# Patient Record
Sex: FEMALE | Race: WHITE | NOT HISPANIC OR LATINO | Employment: FULL TIME | URBAN - METROPOLITAN AREA
[De-identification: names, ages, dates, MRNs, and addresses within clinical notes are randomized per-mention and may not be internally consistent; named-entity substitution may affect disease eponyms.]

---

## 2017-08-29 ENCOUNTER — TELEPHONE (OUTPATIENT)
Dept: NEUROLOGY | Facility: CLINIC | Age: 57
End: 2017-08-29

## 2017-08-29 NOTE — TELEPHONE ENCOUNTER
----- Message from Carmelita Gilliam sent at 8/29/2017  4:15 PM CDT -----  Contact: Patient [phone]  Patient is calling to let Sue know that the appt change is fine.

## 2017-08-31 ENCOUNTER — OFFICE VISIT (OUTPATIENT)
Dept: NEUROLOGY | Facility: CLINIC | Age: 57
End: 2017-08-31
Payer: COMMERCIAL

## 2017-08-31 VITALS
WEIGHT: 196.63 LBS | HEIGHT: 64 IN | DIASTOLIC BLOOD PRESSURE: 89 MMHG | SYSTOLIC BLOOD PRESSURE: 159 MMHG | HEART RATE: 73 BPM | BODY MASS INDEX: 33.57 KG/M2

## 2017-08-31 DIAGNOSIS — G25.2 ORTHOSTATIC TREMOR: Primary | ICD-10-CM

## 2017-08-31 PROCEDURE — 99999 PR PBB SHADOW E&M-EST. PATIENT-LVL III: CPT | Mod: PBBFAC,,, | Performed by: PHYSICIAN ASSISTANT

## 2017-08-31 PROCEDURE — 99204 OFFICE O/P NEW MOD 45 MIN: CPT | Mod: S$GLB,,, | Performed by: PHYSICIAN ASSISTANT

## 2017-08-31 PROCEDURE — 3008F BODY MASS INDEX DOCD: CPT | Mod: S$GLB,,, | Performed by: PHYSICIAN ASSISTANT

## 2017-08-31 RX ORDER — CHLORHEXIDINE GLUCONATE ORAL RINSE 1.2 MG/ML
SOLUTION DENTAL
Refills: 0 | COMMUNITY
Start: 2017-07-20

## 2017-08-31 RX ORDER — LITHIUM CARBONATE 300 MG/1
300 CAPSULE ORAL
Refills: 5 | COMMUNITY
Start: 2017-07-31

## 2017-08-31 RX ORDER — METOPROLOL SUCCINATE 25 MG/1
25 TABLET, EXTENDED RELEASE ORAL
Refills: 3 | COMMUNITY
Start: 2017-07-28

## 2017-08-31 RX ORDER — HYDROCODONE BITARTRATE AND ACETAMINOPHEN 7.5; 325 MG/1; MG/1
TABLET ORAL
Refills: 0 | COMMUNITY
Start: 2017-07-20

## 2017-08-31 RX ORDER — HYDROCODONE BITARTRATE AND ACETAMINOPHEN 5; 325 MG/1; MG/1
325 TABLET ORAL
Refills: 0 | COMMUNITY
Start: 2017-08-17

## 2017-08-31 RX ORDER — GABAPENTIN 100 MG/1
100 CAPSULE ORAL 3 TIMES DAILY
Qty: 90 CAPSULE | Refills: 2 | Status: SHIPPED | OUTPATIENT
Start: 2017-08-31 | End: 2018-09-04

## 2017-08-31 NOTE — PROGRESS NOTES
[facility], Department of Neurology   [address] [place] [address]  Phone:[phone]  Fax: [phone]    Patient Name: Carrol Molina  : 1960  MRN:  [id number]    2017     chief complaint: balance difficulty, tremors     PCP: Dillan Resendez MD.    HPI 17:  Carrol Molina is a 56 y.o. female with HTN, bipolar disorder, and hx bl TKA presents for balance difficulty when standing. Notes onset of symptoms >6 yrs with progressive worsening. Previously had intermittent balance issues with standing now feels like it is more constant. She notes balance difficulty and feeling like she will fall when standing still. has associated tremors of the hands and legs which have gradually become more pronounced. Typically needs to hold on to something while standing. Can stand about 15 seconds before feeling urge to sit. Has to bring a chair with her to garden. Doesn't use assistive device. Denies falls. Symptoms improve with walking, denies balance issues when ambulating.     Denies weakness of LE. Able to go up stairs, rides recumbant bike every morning. Denies difficulty with chewing/swallowing/speech. Denies neck/back pain or hx of previous spine surgery.     Has had MRI brain, cervical spine, and lumbar spine which have been unremarkable for etiology. Had EMG which was normal. (will have these records scanned into EPIC).     Medications:   Current Outpatient Prescriptions   Medication Sig Dispense Refill    chlorhexidine (PERIDEX) 0.12 % solution RINSE AND SPIT WITH 15ML FOR 30 SECONDS BID  0    gabapentin (NEURONTIN) 100 MG capsule Take 1 capsule (100 mg total) by mouth 3 (three) times daily. 90 capsule 2    hydrocodone-acetaminophen 5-325mg (NORCO) 5-325 mg per tablet 325 tablets.  0    hydrocodone-acetaminophen 7.5-325mg (NORCO) 7.5-325 mg per tablet TK 1 T PO Q 4 HOURS PRF PAIN  0    lithium (ESKALITH) 300 MG capsule 300 mg.  5    metoprolol succinate  (TOPROL-XL) 25 MG 24 hr tablet 25 mg.  3     No current facility-administered medications for this visit.        Allergies:  Review of patient's allergies indicates:   Allergen Reactions    Diphenhydramine Other (See Comments)     chest pains    Tolmetin Other (See Comments)     stomach pains.    Codeine     Latex Itching and Swelling     Can the patient chew gum without allergic reation? yes  Can the patient blow up a balloon without reaction? No  Is the patient allergic to kiwi, bananas, avocado, or chestnuts? No  Immediate itch or reaction to latex gloves yes  Known allergy to latex (i.e. blood test)? Yes  Does the patient require special undergarments, such as panties/briefs with special waist bandno    Metronidazole Other (See Comments)     Sores on inside of tongue       PMHx:  Past Medical History:   Diagnosis Date    Bipolar disorder     Hypertension      Past Surgical History:   Procedure Laterality Date    CHOLECYSTECTOMY  2008    HYSTERECTOMY  1984    MOUTH SURGERY  07/2017    oopherectomy  2005    TONSILLECTOMY  1984    TOTAL KNEE ARTHROPLASTY       bilateral       Fhx:  Family History   Problem Relation Age of Onset    Hypertension Mother     Heart disease Father     Heart disease Brother        Shx:   Social History     Social History    Marital status:      Spouse name: N/A    Number of children: N/A    Years of education: N/A     Occupational History    Not on file.     Social History Main Topics    Smoking status: Never Smoker    Smokeless tobacco: Never Used    Alcohol use No    Drug use: Unknown    Sexual activity: Not on file     Other Topics Concern    Not on file     Social History Narrative    No narrative on file       ROS:  Review of Systems (Questions asked; positives or additions noted in BOLD)  Gen Malaise/fatigue, weight change   HEENT Hearing loss, blurred vision, diplopia   Card CP, palpitations   Pulm SOB, cough    Vasc Easy bruising, easy bleeding   "  GI Nausea, vomiting, constipation    Frequency, urgency   M/S Joint pain, myalgias, falls    Endocrine Temperature intolerance, polyuria, polydipsia   Neuro Dizziness, tremors, seizures, focal weakness, Others- as noted in HPI   Psych Memory loss, depression, anxiety, hallucinations     PHYSICAL EXAM:   BP (!) 159/89   Pulse 73   Ht 5' 4" (1.626 m)   Wt 89.2 kg (196 lb 10.4 oz)   BMI 33.76 kg/m²    GEN:  NAD, well-developed, well-groomed.  HEENT: No cervical lymphadenopathy noted.  CARDIOVASCULAR: Radial pulses 2+ bilaterally. No LE edema noted.  NEURO:  Mental Status: Awake, alert, and oriented. Normal attention and concentration.  Speech is fluent and appropriate language function.    Cranial Nerves:  II Pupils are round and reactive to direct and consensual light and accommodation. Visual fields full to confrontation.   III, IV, VI Extraocular eye movements full bilaterally. No ptosis noted.   V Normal facial sensation to pinprick and light touch.   VII Symmetric facial expressions.   VIII Hearing intact to finger rub bilaterally.   IX, X Palate elevates midline and symmetrically.   XI Shoulder elevation is symmetric and full strength bilaterally. Neck rotation strength is normal bilaterally.   XII Tongue is midline.     Sensory: Sensation is normal to pinprick n in the upper and lower extremities bilaterally. Romberg is unsteady.     Motor: Extremities demonstrate normal bulk and tone in all four limbs. No atrophy or fasciculations noted. No pronator drift.   Strength-       RUE: 5/5                LUE: 5/5                RLE: 5/5                LLE: 5/5     Deep Tendon Relfexes: 2+ and symmetric in the upper and lower extremities bilaterally. Babinski downgoing bilaterally.    Coordination: Finger to nose WNL.     Gait: Normal casual gait, some difficulty with tandem.  Low volume tremor of hand when walking.  Tremor of bl upper and lower extremities noted when pt stands still, onset after few seconds "     ASSESSMENT:     ICD-10-CM ICD-9-CM   1. Orthostatic tremor G25.2 333.1       PLAN:  Orders Placed This Encounter    CTA Head and Neck (xpd)    Ferritin    LITHIUM LEVEL    Ambulatory Referral to Neurology    gabapentin (NEURONTIN) 100 MG capsule     Orders Placed This Encounter   Procedures    CTA Head and Neck (xpd)    Ferritin    LITHIUM LEVEL    Ambulatory Referral to Neurology     Likely dx orthostatic tremor. Pt seen today by Dr. Wilson. Will have her f/u with her in clinic.     -labs today   -CTA head and neck   -f/u with Dr. Wilson in movement disorders         The patient indicates understanding of these issues and agrees to the plan.      Attending, Dr. Parker, was available during today's encounter. Any change to plan along with cosign to appear in the EMR.       This document has been electronically signed by Mirtha Schneider PA-C on 9/1/2017, 11:18 AM. I have personally typed this message using the EMR.

## 2017-08-31 NOTE — LETTER
September 1, 2017      Yo Yang MD  [address] [facility] [place] [address]           [facility]  [address]  [facility] [address]  Phone: [phone]  Fax: [phone]          Patient: Carrol Molina   MR Number: [id number]   YOB: 1960   Date of Visit: 8/31/2017       Dear Dr. Yo Yang:    Thank you for referring Carrol Molina to me for evaluation. Attached you will find relevant portions of my assessment and plan of care.    If you have questions, please do not hesitate to call me. I look forward to following Carrol Molina along with you.    Sincerely,    Mirtha Schneider PA-C    Enclosure  CC:  No Recipients    If you would like to receive this communication electronically, please contact [email or web address] or [phone] to request more information on [employer] Link access.    For providers and/or their staff who would like to refer a patient to [facility], please contact us through our one-stop-shop provider referral line, [facility], at [phone].    If you feel you have received this communication in error or would no longer like to receive these types of communications, please e-mail [email or web address]

## 2017-09-01 PROBLEM — G25.2 ORTHOSTATIC TREMOR: Status: ACTIVE | Noted: 2017-09-01

## 2017-09-01 PROBLEM — I10 ESSENTIAL HYPERTENSION: Status: ACTIVE | Noted: 2017-09-01

## 2017-09-01 PROBLEM — F31.9 BIPOLAR DISORDER: Status: ACTIVE | Noted: 2017-09-01

## 2017-09-26 ENCOUNTER — TELEPHONE (OUTPATIENT)
Dept: NEUROLOGY | Facility: CLINIC | Age: 57
End: 2017-09-26

## 2017-09-26 NOTE — TELEPHONE ENCOUNTER
RN attempted to contact [facility] office to discuss in more detail. Message sent to Casi LYMAN, Pre-,mar for clarification.

## 2017-09-26 NOTE — TELEPHONE ENCOUNTER
----- Message from Carmelita Gilliam sent at 9/26/2017  2:46 PM CDT -----  Contact: [facility] [phone]  [facility] is calling to left office know that patient appt that was with them on 9/27/17 has been canceled because a PA was not received from our office.

## 2017-09-27 ENCOUNTER — TELEPHONE (OUTPATIENT)
Dept: NEUROLOGY | Facility: CLINIC | Age: 57
End: 2017-09-27

## 2017-09-27 NOTE — TELEPHONE ENCOUNTER
Received call back from Abel. She states she received msg stating imaging does not require prior auth. LVM informing patient.

## 2017-09-27 NOTE — TELEPHONE ENCOUNTER
----- Message from Jevon Fernandez sent at 9/27/2017  8:50 AM CDT -----  Contact: Pt. [phone]  The patient would like to speak to someone regarding her CTA scheduled for today. They need approval from Mirtha.Please contact Abel at [phone].

## 2017-09-29 ENCOUNTER — TELEPHONE (OUTPATIENT)
Dept: NEUROLOGY | Facility: CLINIC | Age: 57
End: 2017-09-29

## 2017-09-29 DIAGNOSIS — I72.9 ANEURYSM: Primary | ICD-10-CM

## 2017-09-29 NOTE — TELEPHONE ENCOUNTER
----- Message from Faith Caal sent at 9/29/2017  9:10 AM CDT -----  Contact: self @ [phone] or [phone]  Pt is call for her CTA of head and neck results.  Pt says the results were suppose to be forwarded this past Wednesday.  pls call.

## 2017-09-29 NOTE — TELEPHONE ENCOUNTER
Received outside imaging: CTA head and neck. CTA neck unremarkable. 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 Pt currently on Gabapentin 100 bid for orthostatic tremor. She notes no improvement and SE of drowsiness. Discussed with Dr. Wilson. Recommend NSGY referral for evaluation of aneurysm, pt informed to bring imaging on a disk with her to appt. Also recommend changing Gabapentin to 200 mg qhs.

## 2017-10-02 ENCOUNTER — TELEPHONE (OUTPATIENT)
Dept: NEUROSURGERY | Facility: CLINIC | Age: 57
End: 2017-10-02

## 2017-10-02 NOTE — TELEPHONE ENCOUNTER
Advised patient that appointment was scheduled in error. Patient advised that Dr. Candelaria does not treat aneurysm. Advised keeley Dr. Ogden's assistant will call with appt. Patient verbalized understanding.

## 2017-10-03 ENCOUNTER — TELEPHONE (OUTPATIENT)
Dept: NEUROSURGERY | Facility: CLINIC | Age: 57
End: 2017-10-03

## 2017-10-03 NOTE — TELEPHONE ENCOUNTER
LVM WITH PT ON [facility]. DR ROGERS IS OUT OF THE OFFICE UNTIL 10-16, THIS IS THE FIRST AVAIL CLINIC DAY.

## 2017-10-03 NOTE — TELEPHONE ENCOUNTER
----- Message from Frieda Fernandez sent at 10/2/2017 10:08 AM CDT -----  Contact: Pt  Pt is scheduled for 10/16 for a brain aneurysm.  Pt would like to be seen sooner if possible.    Pt can be reached at [phone](work) or [phone](cell).    Thank you

## 2017-10-16 ENCOUNTER — OFFICE VISIT (OUTPATIENT)
Dept: NEUROSURGERY | Facility: CLINIC | Age: 57
End: 2017-10-16
Payer: COMMERCIAL

## 2017-10-16 VITALS
TEMPERATURE: 98 F | HEART RATE: 55 BPM | SYSTOLIC BLOOD PRESSURE: 148 MMHG | BODY MASS INDEX: 33.47 KG/M2 | DIASTOLIC BLOOD PRESSURE: 70 MMHG | WEIGHT: 195 LBS

## 2017-10-16 DIAGNOSIS — I67.1 CEREBRAL ANEURYSM: Primary | ICD-10-CM

## 2017-10-16 PROCEDURE — 99999 PR PBB SHADOW E&M-EST. PATIENT-LVL III: CPT | Mod: PBBFAC,,, | Performed by: NEUROLOGICAL SURGERY

## 2017-10-16 PROCEDURE — 99204 OFFICE O/P NEW MOD 45 MIN: CPT | Mod: S$GLB,,, | Performed by: NEUROLOGICAL SURGERY

## 2017-10-16 NOTE — LETTER
October 16, 2017      Mirtha Schneider PA-C  [address]  [facility] [address]           [facility] 7th Fl  [address]  [facility] [address]  Phone: [phone]          Patient: Carrol Molina   MR Number: [id number]   YOB: 1960   Date of Visit: 10/16/2017       Dear Mirtha Schneider:    Thank you for referring Carrol Molina to me for evaluation. Attached you will find relevant portions of my assessment and plan of care.    If you have questions, please do not hesitate to call me. I look forward to following Carrol Molina along with you.    Sincerely,    Tj Altamirano MD    Enclosure  CC:  No Recipients    If you would like to receive this communication electronically, please contact [email or web address] or [phone] to request more information on [employer] Link access.    For providers and/or their staff who would like to refer a patient to [facility], please contact us through our one-stop-shop provider referral line, [facility], at [phone].    If you feel you have received this communication in error or would no longer like to receive these types of communications, please e-mail [email or web address]

## 2018-07-25 ENCOUNTER — TELEPHONE (OUTPATIENT)
Dept: NEUROLOGY | Facility: CLINIC | Age: 58
End: 2018-07-25

## 2018-07-25 NOTE — TELEPHONE ENCOUNTER
----- Message from Ry Bennett sent at 7/24/2018  2:35 PM CDT -----  Needs Advice    Reason for call:  Pt is calling to schedule an appt w/ the doctor to f/u for orthostatic tremor. Pt said she saw the doctor when she came in for appt w/ Mirtha on 08/31/1  Communication Preference: [phone]  Additional Information:

## 2018-07-25 NOTE — TELEPHONE ENCOUNTER
----- Message from Francisco Ortiz sent at 7/25/2018  3:09 PM CDT -----  Contact: Patient @ work [phone] between 8am-5pm  Patient is calling to get an update on the f/u appt, pls call tomorrow

## 2018-07-25 NOTE — TELEPHONE ENCOUNTER
Called and left a message for  regarding an appt with . I stated to give us a call regarding this matter.

## 2018-07-30 ENCOUNTER — TELEPHONE (OUTPATIENT)
Dept: NEUROLOGY | Facility: CLINIC | Age: 58
End: 2018-07-30

## 2018-07-30 NOTE — TELEPHONE ENCOUNTER
----- Message from Coral Treviño sent at 7/30/2018  9:27 AM CDT -----  Patient Returning Call from [facility]    Who Left Message for Patient:Renita  Communication Preference:pt @ [phone]  Additional Information:returning a call to schedule an appt. Please call.

## 2018-07-31 ENCOUNTER — TELEPHONE (OUTPATIENT)
Dept: NEUROLOGY | Facility: CLINIC | Age: 58
End: 2018-07-31

## 2018-08-09 ENCOUNTER — TELEPHONE (OUTPATIENT)
Dept: NEUROSURGERY | Facility: CLINIC | Age: 58
End: 2018-08-09

## 2018-08-09 NOTE — TELEPHONE ENCOUNTER
----- Message from Jevon Fernandez sent at 8/9/2018 10:09 AM CDT -----  Contact: Pt. [phone]  Needs Advice    Reason for call:The patient would like to speak to someone regarding scheduling her appointment and CT. She prefers 9/4/18 or Early October if avail       Communication Preference:PHONE     Additional Information:

## 2018-08-09 NOTE — TELEPHONE ENCOUNTER
LVM WITH PT, APPTS FOR CTA & FU WITH DR SUE LUNA FOR 10-1-18, SHE IS TO CALL ME IF THE DATE & TIMES ARE NOT DOABLE. APPT SLIP IN THE MAIL

## 2018-09-04 ENCOUNTER — OFFICE VISIT (OUTPATIENT)
Dept: NEUROLOGY | Facility: CLINIC | Age: 58
End: 2018-09-04
Payer: COMMERCIAL

## 2018-09-04 VITALS
HEART RATE: 90 BPM | SYSTOLIC BLOOD PRESSURE: 148 MMHG | WEIGHT: 193.31 LBS | DIASTOLIC BLOOD PRESSURE: 86 MMHG | HEIGHT: 64 IN | BODY MASS INDEX: 33 KG/M2

## 2018-09-04 DIAGNOSIS — G25.2 ORTHOSTATIC TREMOR: Primary | ICD-10-CM

## 2018-09-04 DIAGNOSIS — F31.70 BIPOLAR DISORDER IN FULL REMISSION, MOST RECENT EPISODE UNSPECIFIED TYPE: ICD-10-CM

## 2018-09-04 DIAGNOSIS — I72.5 ANEURYSM OF BASILAR ARTERY: ICD-10-CM

## 2018-09-04 DIAGNOSIS — I10 ESSENTIAL HYPERTENSION: ICD-10-CM

## 2018-09-04 DIAGNOSIS — G25.2 ORTHOSTATIC TREMOR: ICD-10-CM

## 2018-09-04 PROCEDURE — 99214 OFFICE O/P EST MOD 30 MIN: CPT | Mod: S$GLB,,, | Performed by: PSYCHIATRY & NEUROLOGY

## 2018-09-04 PROCEDURE — 99999 PR PBB SHADOW E&M-EST. PATIENT-LVL IV: CPT | Mod: PBBFAC,,, | Performed by: PSYCHIATRY & NEUROLOGY

## 2018-09-04 PROCEDURE — 3008F BODY MASS INDEX DOCD: CPT | Mod: CPTII,S$GLB,, | Performed by: PSYCHIATRY & NEUROLOGY

## 2018-09-04 RX ORDER — LISINOPRIL 5 MG/1
5 TABLET ORAL DAILY
COMMUNITY

## 2018-09-04 RX ORDER — CHLORDIAZEPOXIDE HYDROCHLORIDE 5 MG/1
5 CAPSULE, GELATIN COATED ORAL 2 TIMES DAILY
Qty: 60 CAPSULE | Refills: 5 | Status: SHIPPED | OUTPATIENT
Start: 2018-09-04 | End: 2018-09-04 | Stop reason: SDUPTHER

## 2018-09-04 RX ORDER — CHLORDIAZEPOXIDE HYDROCHLORIDE 5 MG/1
5 CAPSULE, GELATIN COATED ORAL 2 TIMES DAILY
Qty: 60 CAPSULE | Refills: 5 | Status: SHIPPED | OUTPATIENT
Start: 2018-09-04 | End: 2018-09-05 | Stop reason: SDUPTHER

## 2018-09-04 NOTE — TELEPHONE ENCOUNTER
----- Message from Ry Bennett sent at 9/4/2018  4:48 PM CDT -----  Needs Advice    Reason for call: Pt states script for chlordiazepoxide (LIBRIUM) 5 MG capsule needs to go to pharmacy below, not [facility] in [place], [place], due to pt being in [place]       Communication Preference: [phone]  Additional Information:    [facility]  PHONE: [phone] FAX: [phone]

## 2018-09-04 NOTE — ASSESSMENT & PLAN NOTE
Tremors of legs and arms upon standing.  Not the classic high frequency of OT, but still very much position-dependent.   -> trial of librium, then will consider primidone, levodopa, depakote (in that order)   -> recommended early senior living or disability as her endurance and high fall risk are starting to affect her ability to work.

## 2018-09-04 NOTE — LETTER
September 4, 2018      Yo Yang MD  [address] [facility] [place] [address]           [facility]  [address]  [place] [address]  Phone: [phone]  Fax: [phone]          Patient: Carrol Molina   MR Number: [id number]   YOB: 1960   Date of Visit: 9/4/2018       Dear Dr. Yo Yang:    Thank you for referring Carrol Molina to me for evaluation. Attached you will find relevant portions of my assessment and plan of care.    If you have questions, please do not hesitate to call me. I look forward to following Carrol Molina along with you.    Sincerely,    Riya Wilson MD    Enclosure  CC:  No Recipients    If you would like to receive this communication electronically, please contact [email or web address] or [phone] to request more information on [employer] Link access.    For providers and/or their staff who would like to refer a patient to [facility], please contact us through our one-stop-shop provider referral line, [facility], at [phone].    If you feel you have received this communication in error or would no longer like to receive these types of communications, please e-mail [email or web address]

## 2018-09-04 NOTE — TELEPHONE ENCOUNTER
----- Message from Ry Bennett sent at 9/4/2018  4:48 PM CDT -----  Needs Advice    Reason for call: Pt states script for chlordiazepoxide (LIBRIUM) 5 MG capsule needs to go to pharmacy below, not [facility] in [place], [place], due to pt being in [place]       Communication Preference: [phone]  Additional Information:    [facility]  PHONE: [phone] FAX: [phone]

## 2018-09-04 NOTE — TELEPHONE ENCOUNTER
----- Message from Ry Bennett sent at 9/4/2018  4:48 PM CDT -----  Needs Advice    Reason for call: Pt states script for chlordiazepoxide (LIBRIUM) 5 MG capsule needs to go to pharmacy below, not [facility] in [place], [place], due to pt being in [place]       Communication Preference: [phone]  Additional Information:    [facility]  PHONE: [phone] FAX: [phone]

## 2018-09-04 NOTE — PROGRESS NOTES
"Carrol Molina  I. Chief Complaints during this visit:  New Patient visit for  Tremor, imbalance    Yo Yang MD  [address] [facility] [place] [address]    Primary Care Physician  Dillan Resendez MD  [address] [facility] [place] [address]        History of present illness:   57 y.o.  female seen in consultation at the request of  Dr. Parker/ Mirtha Schneider for evaluation of above.  New to me, though I saw her briefly last year during her neuro visit 10/2017.  Originally sent by Dr. Yang to Neuromuscular clinic for bilateral lower extremity weakness.    Since last seen, she says there has been progression into the arms, now, but still only with standing.  Wondering about prognosis.  She is still working and has very little disability when sitting at her dest, but she has to stand and get things from printer, etc and this is when she is fearful of falling.  She also says it takes a little bit to "get started" when she stands up.  Her employer is not aware of her diagnosis.      Mornings are particularly difficult.  Standing, showering, etc take a lot of extra time so she does not fall.  She reports fatigue and far less energetic than in her past.    PCP recently added lisinopril for high blood pressures, this has brought her pressures down well.  No effect on tremors.    Tumor resection off colon 3 weeks ago.  Pathology was diverticulitis.    Has appt with Dr. Altamirano for small, incidental basilar aneurysm f/u.    Bipolar in full control.  On lithium x 30 years.    From Adarsh's note 10/16/17:  She was seen here by Dr. Parker and by Dr. Wilson in Neurology and was given a diagnosis of orthostatic   tremor.  As part of her evaluation, the CTA of the head and neck was done here,   showing a small cerebral aneurysm and she was referred for neurosurgical   evaluation.     From Jennie's note 8/31/17:  Carrol Molina is a 56 y.o. female with HTN, bipolar " disorder, and hx bl TKA presents for balance difficulty when standing. Notes onset of symptoms >6 yrs with progressive worsening. Previously had intermittent balance issues with standing now feels like it is more constant. She notes balance difficulty and feeling like she will fall when standing still. has associated tremors of the hands and legs which have gradually become more pronounced. Typically needs to hold on to something while standing. Can stand about 15 seconds before feeling urge to sit. Has to bring a chair with her to garden. Doesn't use assistive device. Denies falls. Symptoms improve with walking, denies balance issues when ambulating.   Denies weakness of LE. Able to go up stairs, rides recumbant bike every morning. Denies difficulty with chewing/swallowing/speech. Denies neck/back pain or hx of previous spine surgery.   Has had MRI brain, cervical spine, and lumbar spine which have been unremarkable for etiology. Had EMG which was normal. (will have these records scanned into [facility]).         II.  Review of systems:  As in HPI,  otherwise, balance 10 systems reviewed and are negative.    III.  Past Medical History:   Diagnosis Date    Bipolar disorder     Hypertension      Family History   Problem Relation Age of Onset    Hypertension Mother     Heart disease Father     Heart disease Brother      Social History     Socioeconomic History    Marital status:      Spouse name: None    Number of children: None    Years of education: None    Highest education level: None   Social Needs    Financial resource strain: None    Food insecurity - worry: None    Food insecurity - inability: None    Transportation needs - medical: None    Transportation needs - non-medical: None   Occupational History    None   Tobacco Use    Smoking status: Never Smoker    Smokeless tobacco: Never Used   Substance and Sexual Activity    Alcohol use: No    Drug use: No    Sexual activity: No      "Partners: Male   Other Topics Concern    None   Social History Narrative    None         Current Outpatient Medications on File Prior to Visit   Medication Sig Dispense Refill    AMLODIPINE BESYLATE, BULK, MISC Take 5 mg by mouth once daily.      chlorhexidine (PERIDEX) 0.12 % solution RINSE AND SPIT WITH 15ML FOR 30 SECONDS BID  0    hydrocodone-acetaminophen 5-325mg (NORCO) 5-325 mg per tablet 325 tablets.  0    hydrocodone-acetaminophen 7.5-325mg (NORCO) 7.5-325 mg per tablet TK 1 T PO Q 4 HOURS PRF PAIN  0    lisinopril (PRINIVIL,ZESTRIL) 5 MG tablet Take 5 mg by mouth once daily.      lithium (ESKALITH) 300 MG capsule 300 mg.  5    metoprolol succinate (TOPROL-XL) 25 MG 24 hr tablet 25 mg.  3    [DISCONTINUED] gabapentin (NEURONTIN) 100 MG capsule Take 1 capsule (100 mg total) by mouth 3 (three) times daily. 90 capsule 2     No current facility-administered medications on file prior to visit.        PRIOR problem-specific medications tried:  Gabapentin (no effect on tremors)    Review of patient's allergies indicates:   Allergen Reactions    Diphenhydramine Other (See Comments)     chest pains    Tolmetin Other (See Comments)     stomach pains.    Codeine     Latex Itching and Swelling     Can the patient chew gum without allergic reation? yes  Can the patient blow up a balloon without reaction? No  Is the patient allergic to kiwi, bananas, avocado, or chestnuts? No  Immediate itch or reaction to latex gloves yes  Known allergy to latex (i.e. blood test)? Yes  Does the patient require special undergarments, such as panties/briefs with special waist bandno    Metronidazole Other (See Comments)     Sores on inside of tongue       IV. Physical Exam    Vitals:    09/04/18 0847 09/04/18 0911 09/04/18 0912   BP: 124/74 131/79 (!) 148/86   Patient Position:  Sitting    Pulse: 82 70 90   Weight: 87.7 kg (193 lb 5.5 oz)     Height: 5' 4" (1.626 m)       General appearance: Well nourished, well developed, " no acute distress.         Cardiovascular:  pedal pulses 2, no edema or cyanosis, heart regular rate and rhythym, no carotid bruits.         -------------------------------------------------------------  Facial Expression: normal       Affect: full       Orientation to time & place:  Oriented to time, place, person and situation       Attention & concentration:  Normal attention span and concentration       Memory:  Recent and remote memory intact  Language: Spontaneous, fluent; able to repeat and name objects        Fund of knowledge:  Aware of current events        Speech:  normal (not dysarthric)  -------------------------------------------------------  Cranial nerves: normal visual acuity, visual fields full, optic discs not visualized, pupils equal round and reactive, extraocular movements intact,       facial sensation intact, face symmetrical, hearing intact to whisper, palate raises midline, shoulder shrug strength normal, tongue protrudes midline.        -------------------------------------------------------  Musculoskeletal  Muscle tone: all 4 extremities normal        Muscle Bulk: all 4 extremities normal        Muscle strength:  5/5 in all 4 extremities        No pronator drift  Sensation: Intact to light touch in all extremities        Deep tendon Reflexes: 2+ bilateral biceps, triceps, patella and ankles        --------------------------------------------------------------  Cerebellar and Coordination  Gait:  Cautious, wide-based, short stride       Finger-nose: no dysmetria       Rapid Alternating Movements (pronation/supination):  R normal; L normal  --------------------------------------------------------------  Abnormality of movement (bradykinesia, hyperkinesia) present? No    Tremor present?   Yes, after standing for about 20 seconds, she has mid-frequency tremor of legs (visible) and hands.   Posture: 1: Slight: Not quite erect, but posture could be normal for older person.  Postural stability:   Loses balance about 20-30 seconds after standing    V.  Laboratory/ Radiological Data:         Mri c-spine 8/2017 (from Dr. Yang's notes):  MRI C-spine: MRI scan cervical spine with and without contrast demonstrating      VI. Assessment and Plan            Problem List Items Addressed This Visit        1 - High    Orthostatic tremor - Primary    Current Assessment & Plan     Tremors of legs and arms upon standing.  Not the classic high frequency of OT, but still very much position-dependent.   -> trial of librium, then will consider primidone, levodopa, depakote (in that order)   -> recommended early detention or disability as her endurance and high fall risk are starting to affect her ability to work.         Relevant Medications    chlordiazepoxide (LIBRIUM) 5 MG capsule       2     Bipolar disorder in full remission       3     Hypertension    Current Assessment & Plan     No evidence of OH on exam today.            4     Aneurysm of basilar artery    Overview     Repeat CTA 2018, per Dr. Dowling                     No Follow-up on file.             ___________________________________________________________    I appreciate the opportunity to participate in the care of this patient and will communicate my assessment and plan back to the referring physician via copy of this note.

## 2018-09-05 DIAGNOSIS — G25.2 ORTHOSTATIC TREMOR: ICD-10-CM

## 2018-09-05 RX ORDER — CHLORDIAZEPOXIDE HYDROCHLORIDE 5 MG/1
5 CAPSULE, GELATIN COATED ORAL 2 TIMES DAILY
Qty: 60 CAPSULE | Refills: 5 | Status: SHIPPED | OUTPATIENT
Start: 2018-09-05 | End: 2018-10-05

## 2018-09-05 NOTE — TELEPHONE ENCOUNTER
----- Message from Faith Caal sent at 9/5/2018 10:02 AM CDT -----  Contact: self @ [phone]  Pt says [facility] in [place] and they do not have her prescription for chlordiazepoxide (LIBRIUM) 5 MG capsule.  Pt says she is on her way to [place] to see her ailling mother.  She would like to have it sent to [facility].    [facility] [address] [place] [address] [address] AT [address]                      [phone] (Phone)               [phone] (Fax)

## 2018-10-01 ENCOUNTER — OFFICE VISIT (OUTPATIENT)
Dept: NEUROSURGERY | Facility: CLINIC | Age: 58
End: 2018-10-01
Payer: COMMERCIAL

## 2018-10-01 ENCOUNTER — HOSPITAL ENCOUNTER (OUTPATIENT)
Dept: RADIOLOGY | Facility: HOSPITAL | Age: 58
Discharge: HOME | End: 2018-10-01
Attending: NEUROLOGICAL SURGERY
Payer: COMMERCIAL

## 2018-10-01 VITALS
SYSTOLIC BLOOD PRESSURE: 118 MMHG | BODY MASS INDEX: 32.9 KG/M2 | WEIGHT: 192.69 LBS | HEART RATE: 54 BPM | HEIGHT: 64 IN | DIASTOLIC BLOOD PRESSURE: 60 MMHG | TEMPERATURE: 98 F

## 2018-10-01 DIAGNOSIS — I67.1 CEREBRAL ANEURYSM: Primary | ICD-10-CM

## 2018-10-01 DIAGNOSIS — I67.1 CEREBRAL ANEURYSM: ICD-10-CM

## 2018-10-01 LAB
CREAT SERPL-MCNC: 1 MG/DL (ref 0.5–1.4)
SAMPLE: NORMAL

## 2018-10-01 PROCEDURE — 99213 OFFICE O/P EST LOW 20 MIN: CPT | Mod: S$GLB,,, | Performed by: NEUROLOGICAL SURGERY

## 2018-10-01 PROCEDURE — 25500020 PHARM REV CODE 255: Performed by: NEUROLOGICAL SURGERY

## 2018-10-01 PROCEDURE — 3008F BODY MASS INDEX DOCD: CPT | Mod: CPTII,S$GLB,, | Performed by: NEUROLOGICAL SURGERY

## 2018-10-01 PROCEDURE — 70496 CT ANGIOGRAPHY HEAD: CPT | Mod: 26,,, | Performed by: RADIOLOGY

## 2018-10-01 PROCEDURE — 99999 PR PBB SHADOW E&M-EST. PATIENT-LVL III: CPT | Mod: PBBFAC,,, | Performed by: NEUROLOGICAL SURGERY

## 2018-10-01 PROCEDURE — 70496 CT ANGIOGRAPHY HEAD: CPT | Mod: TC

## 2018-10-01 RX ORDER — CLINDAMYCIN HYDROCHLORIDE 150 MG/1
300 CAPSULE ORAL
COMMUNITY
Start: 2018-09-27 | End: 2018-10-07

## 2018-10-01 RX ORDER — IRBESARTAN 150 MG/1
TABLET ORAL
Refills: 0 | COMMUNITY
Start: 2018-09-17

## 2018-10-01 RX ADMIN — IOHEXOL 100 ML: 350 INJECTION, SOLUTION INTRAVENOUS at 10:10

## 2018-10-01 NOTE — PROGRESS NOTES
This office note has been dictated.  Carrol Molina returned in neurosurgical followup to the office today.  She is   a 57-year-old lady with a history of orthostatic tremor who was found to have a   small incidental basilar artery bifurcation aneurysm by CTA.  It was felt this   could be followed and she returns today for followup CTA of the cerebral   vessels.  This summer, she presented with abdominal mass requiring laparotomy.    This turned out to be diverticulitis and she underwent a colon resection.  She   has recovered well from this.  She continues with her tremor and feels that this   may be increasing somewhat beginning to involve the arms more.  Dr. Wilson has   prescribed her Librium for this.  Her mother just passed away with multiple   myeloma.    On brief examination today, she is bright and alert and speaking clearly.  She   shows good extraocular movements.  No focal weakness.  She stood and walked   without difficulty and seems generally neurologically intact.    CTA of the brain was repeated at [facility] today.  Again, is seen a small   2-3 mm aneurysm at the basilar artery bifurcation pointing slightly to the left.    This has no secondary loculations and seems quite benign.    The aneurysm seems to be stable.  I do not believe she requires any specific   treatment at this point.  I will have her return in about two years with a   followup CTA and we can see if there is any growth of the aneurysm.      SHERRY/BRANDAN  dd: 10/01/2018 13:23:49 (CDT)  td: 10/02/2018 05:45:57 (CDT)  Doc ID   #[id number]  Job ID #[id number]    CC: Carrol Molina

## 2019-01-03 NOTE — PROGRESS NOTES
Pt is A&Ox4, Reports feeling better and wanting to go home. All results are available in epic and chart marked for ERP reevaluation.    This office note has been dictated.  Carrol Molina is seen in neurosurgical consultation at the office this   morning.  She is a 56-year-old lady who began to note some tremor and feeling of   imbalance in her legs about six years ago.  She was seen in neurological   evaluation and has had MRI, EMG and other tests done.  She feels that the   problem is gradually progressive.  The shaking in her legs comes on more quickly   than it did in the past and she feels like she is going to fall unless she has   something to hold on to.  At times, the tremor will involve her arms, but it is   primarily lower extremity.  When she is walking, she does not notice this as   much.  She has not actually taken a fall.  She has noted no particular stiffness   or weakness of the extremities.  She has no current problems with vision, no   double vision, difficulty focusing.  She has had no speech difficulty, no   specific weakness or numbness of extremities.  She was seen here by Dr. Parker and by Dr. Wilson in Neurology and was given a diagnosis of orthostatic   tremor.  As part of her evaluation, the CTA of the head and neck was done here,   showing a small cerebral aneurysm and she was referred for neurosurgical   evaluation.  Past medical history includes bilateral knee replacements in 2013   and 2015.  The problem of shaking in her legs and balance difficulties began   before these operations.  She has a history of hypertension and is on medication   for this.  She also takes lithium.  She was started on Neurontin for the   shaking in her legs.  To this point that has not proved helpful.  She is   .  She works full-time in [employer]. Review of systems is otherwise   negative.    On physical examination, she is a well-developed, well-nourished white lady who   is alert and cooperative.  Examination of the head shows no tenderness over the   scalp.  Eyes show full extraocular movements.  Pupils are equal and  reactive and   fundi show clear disk margins.  She had a hemorrhage in the sclera of the left   eye a year ago.  Her  had a picture of this.  This has gone on to   resolution.  Hearing is good bilaterally.  The neck is supple.  On neurological   examination, she is speaking clearly with good memory, normal affect.    Finger-to-nose is done well.  There is no apparent tremor in the upper   extremities.  When she stood with her feet together, she developed an irregular   jerking of the muscles in her lower extremities, particularly her thighs.  There   is not a rhythmic tremor per se and her gait itself is fairly normal.  She did   have some difficulty with tandem gait swaying somewhat as she placed one foot in   front or the other.  Cranial nerves are otherwise intact.  She has normal   facial sensation and movement.  The tongue protrudes in midline.  She shows good   strength in the extremities, normal sensation and symmetrical deep tendon   reflexes.    CT and CTA of the head were done at [facility] on 09/27/17.  CTA of the   neck is normal.  CTA of the brain shows some irregularity of the anterior   communicating artery complex.  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  There is no definite aneurysm.  There is a small 2 mm aneurysm at the   basilar artery bifurcation projecting somewhat to the left.    IMPRESSION:  1.  Small basilar artery aneurysm.  2.  Orthostatic tremor.    RECOMMENDATIONS:  I have told her that about 2% of the population has aneurysm.    Overall, the risk for subarachnoid hemorrhage from aneurysm is about 1 to 3%   per year, but with an aneurysm this small, it would be well below 1%.  I believe   this can be followed up and I will have her return in one year with a CTA of   the head, as she has no particular restrictions related to the small aneurysm.          SHERRY/BRANDAN  dd: 10/16/2017 10:46:01 (CDT)  td: 10/16/2017 20:37:59 (CDT)  Doc ID   #[id number]  Job ID  #[id number]    CC: Carrol Molina

## 2022-03-03 ENCOUNTER — TELEPHONE (OUTPATIENT)
Dept: NEUROSURGERY | Facility: CLINIC | Age: 62
End: 2022-03-03
Payer: COMMERCIAL

## 2022-03-03 DIAGNOSIS — I67.1 CEREBRAL ANEURYSM, NONRUPTURED: Primary | ICD-10-CM

## 2022-03-03 NOTE — TELEPHONE ENCOUNTER
----- Message from Ania Moody, RN sent at 2/28/2022 10:08 AM CST -----  Regarding: FW: speak with NELA  Contact: patient  Its been 4 years since her last visit I am not sure if insurance will pay for the CTA with no new notes  ----- Message -----  From: Penny Bee  Sent: 2/28/2022   8:20 AM CST  To: Adarsh HARDY Staff  Subject: speak with NELA                                    [phone]   please call patient was told to reach out to EJ to schedule a cat-scan and see the doctor after waiting on a call back thanks.

## 2022-04-20 ENCOUNTER — TELEPHONE (OUTPATIENT)
Dept: NEUROSURGERY | Facility: CLINIC | Age: 62
End: 2022-04-20
Payer: COMMERCIAL

## 2022-04-20 DIAGNOSIS — I67.1 CEREBRAL ANEURYSM, NONRUPTURED: Primary | ICD-10-CM

## 2022-07-06 ENCOUNTER — TELEPHONE (OUTPATIENT)
Dept: NEUROSURGERY | Facility: CLINIC | Age: 62
End: 2022-07-06
Payer: COMMERCIAL

## 2022-07-06 NOTE — TELEPHONE ENCOUNTER
LM for pt, explained that Dr. Altamirano does not have clinic on Wednesdays 8/31, offered pt 9/1 or another date that would be convenient for her. Advised she call back at her convenience.     ----- Message from Ina Dempsey sent at 7/6/2022  8:31 AM CDT -----  Regarding: appt  Contact: pt @ [phone]  Pt will be out of town on 8/1 and would like to reschedule appt to 8/31. Asking for a call back

## 2022-07-08 ENCOUNTER — TELEPHONE (OUTPATIENT)
Dept: NEUROSURGERY | Facility: CLINIC | Age: 62
End: 2022-07-08
Payer: COMMERCIAL

## 2022-07-08 NOTE — TELEPHONE ENCOUNTER
CB and APRIL for pt,  her CT and BW with appt with Dr. Altamirano 9/1/22. Advised pt call back to reschedule if needed.     ----- Message from Tremontana Chevalier sent at 7/8/2022  9:02 AM CDT -----  Regarding: appt  Contact: pt @ [phone]  Pt returning call from Elizabeth in Dr. Altamirano's office. Pt wanting to coordinate the rescheduling of the office visit with Dr. Altamirano and labs to be on the same day. No avail appts in epic. Pls call pt @ [phone].

## 2022-08-30 ENCOUNTER — TELEPHONE (OUTPATIENT)
Dept: NEUROSURGERY | Facility: CLINIC | Age: 62
End: 2022-08-30
Payer: COMMERCIAL

## 2022-09-01 ENCOUNTER — OFFICE VISIT (OUTPATIENT)
Dept: NEUROSURGERY | Facility: CLINIC | Age: 62
End: 2022-09-01
Payer: COMMERCIAL

## 2022-09-01 VITALS
HEIGHT: 64 IN | DIASTOLIC BLOOD PRESSURE: 80 MMHG | BODY MASS INDEX: 32.78 KG/M2 | SYSTOLIC BLOOD PRESSURE: 143 MMHG | HEART RATE: 65 BPM | WEIGHT: 192 LBS

## 2022-09-01 DIAGNOSIS — I67.1 CEREBRAL ANEURYSM: Primary | ICD-10-CM

## 2022-09-01 DIAGNOSIS — R25.1 TREMOR: ICD-10-CM

## 2022-09-01 PROCEDURE — 1159F MED LIST DOCD IN RCRD: CPT | Mod: CPTII,S$GLB,, | Performed by: NEUROLOGICAL SURGERY

## 2022-09-01 PROCEDURE — 3077F PR MOST RECENT SYSTOLIC BLOOD PRESSURE >= 140 MM HG: ICD-10-PCS | Mod: CPTII,S$GLB,, | Performed by: NEUROLOGICAL SURGERY

## 2022-09-01 PROCEDURE — 1159F PR MEDICATION LIST DOCUMENTED IN MEDICAL RECORD: ICD-10-PCS | Mod: CPTII,S$GLB,, | Performed by: NEUROLOGICAL SURGERY

## 2022-09-01 PROCEDURE — 4010F ACE/ARB THERAPY RXD/TAKEN: CPT | Mod: CPTII,S$GLB,, | Performed by: NEUROLOGICAL SURGERY

## 2022-09-01 PROCEDURE — 1160F RVW MEDS BY RX/DR IN RCRD: CPT | Mod: CPTII,S$GLB,, | Performed by: NEUROLOGICAL SURGERY

## 2022-09-01 PROCEDURE — 4010F PR ACE/ARB THEARPY RXD/TAKEN: ICD-10-PCS | Mod: CPTII,S$GLB,, | Performed by: NEUROLOGICAL SURGERY

## 2022-09-01 PROCEDURE — 99999 PR PBB SHADOW E&M-EST. PATIENT-LVL V: CPT | Mod: PBBFAC,,, | Performed by: NEUROLOGICAL SURGERY

## 2022-09-01 PROCEDURE — 99999 PR PBB SHADOW E&M-EST. PATIENT-LVL V: ICD-10-PCS | Mod: PBBFAC,,, | Performed by: NEUROLOGICAL SURGERY

## 2022-09-01 PROCEDURE — 99203 PR OFFICE/OUTPT VISIT, NEW, LEVL III, 30-44 MIN: ICD-10-PCS | Mod: S$GLB,,, | Performed by: NEUROLOGICAL SURGERY

## 2022-09-01 PROCEDURE — 3079F PR MOST RECENT DIASTOLIC BLOOD PRESSURE 80-89 MM HG: ICD-10-PCS | Mod: CPTII,S$GLB,, | Performed by: NEUROLOGICAL SURGERY

## 2022-09-01 PROCEDURE — 3079F DIAST BP 80-89 MM HG: CPT | Mod: CPTII,S$GLB,, | Performed by: NEUROLOGICAL SURGERY

## 2022-09-01 PROCEDURE — 1160F PR REVIEW ALL MEDS BY PRESCRIBER/CLIN PHARMACIST DOCUMENTED: ICD-10-PCS | Mod: CPTII,S$GLB,, | Performed by: NEUROLOGICAL SURGERY

## 2022-09-01 PROCEDURE — 99203 OFFICE O/P NEW LOW 30 MIN: CPT | Mod: S$GLB,,, | Performed by: NEUROLOGICAL SURGERY

## 2022-09-01 PROCEDURE — 3008F BODY MASS INDEX DOCD: CPT | Mod: CPTII,S$GLB,, | Performed by: NEUROLOGICAL SURGERY

## 2022-09-01 PROCEDURE — 3077F SYST BP >= 140 MM HG: CPT | Mod: CPTII,S$GLB,, | Performed by: NEUROLOGICAL SURGERY

## 2022-09-01 PROCEDURE — 3008F PR BODY MASS INDEX (BMI) DOCUMENTED: ICD-10-PCS | Mod: CPTII,S$GLB,, | Performed by: NEUROLOGICAL SURGERY

## 2022-09-01 RX ORDER — CETIRIZINE HYDROCHLORIDE 10 MG/1
10 TABLET ORAL
COMMUNITY

## 2022-09-01 RX ORDER — MUPIROCIN 20 MG/G
OINTMENT TOPICAL
COMMUNITY
Start: 2022-03-25

## 2022-09-01 RX ORDER — PREDNISONE 10 MG/1
TABLET ORAL
COMMUNITY
Start: 2022-05-10

## 2022-09-01 RX ORDER — HYDROCHLOROTHIAZIDE 12.5 MG/1
12.5 TABLET ORAL DAILY
COMMUNITY
Start: 2022-08-08

## 2022-09-01 NOTE — PROGRESS NOTES
Afrin twice a day for 3 days    Flonase increase to twice a day for 7 days then once a day     Start prednisone tomorrow    For a continuous bleeding, Saturate cotton with TXA 1-2ml,, place up each nostril use nose clip for 15 mins Carrol Molina was seen in neurosurgical follow-up at the office today.  She is a 61-year-old lady who was found to have an incidental basilar artery bifurcation aneurysm while being evaluated for orthostatic tremor in the [facility] here.  I last saw her on 10/01/2018.  The small aneurysm measured about 3 mm in diameter and it was not felt that treatment for this was required.  Since I last saw her she has continued to complain of bilateral leg tremor and difficulty standing for any period of time.  There is a little tremor in her hands.  She has had no sudden headache, visual change, or focal weakness.  She does say that sometimes her speech is a little garbled or she has difficulty expressing herself.    On examination today she is alert and cooperative.  Extraocular movements are good and pupils equal.  Speech is clear today.  She shows no clear focal weakness but when standing she felt that her legs were not holding her well.  There was no clear tremor in the lower extremities but she did have mild tremor of the hands on finger-to-nose testing.    No new imaging studies were available today.  I will have MRI of the brain done with [facility] both to evaluate the aneurysm and to see if there is any anatomical basis for her tremor and imbalance issues.  I will see her back with her study and refer her [facility] to [facility] for further evaluation.

## 2022-09-14 ENCOUNTER — TELEPHONE (OUTPATIENT)
Dept: NEUROSURGERY | Facility: CLINIC | Age: 62
End: 2022-09-14
Payer: COMMERCIAL

## 2022-09-14 DIAGNOSIS — I67.1 CEREBRAL ANEURYSM, NONRUPTURED: Primary | ICD-10-CM

## 2022-09-16 ENCOUNTER — TELEPHONE (OUTPATIENT)
Dept: NEUROSURGERY | Facility: CLINIC | Age: 62
End: 2022-09-16
Payer: COMMERCIAL

## 2022-09-16 DIAGNOSIS — I67.1 CEREBRAL ANEURYSM, NONRUPTURED: Primary | ICD-10-CM

## 2022-10-28 ENCOUNTER — PATIENT MESSAGE (OUTPATIENT)
Dept: NEUROSURGERY | Facility: CLINIC | Age: 62
End: 2022-10-28
Payer: COMMERCIAL

## 2022-11-02 ENCOUNTER — TELEPHONE (OUTPATIENT)
Dept: NEUROSURGERY | Facility: CLINIC | Age: 62
End: 2022-11-02
Payer: COMMERCIAL

## 2022-11-02 DIAGNOSIS — I67.1 CEREBRAL ANEURYSM, NONRUPTURED: ICD-10-CM

## 2023-02-08 ENCOUNTER — HOSPITAL ENCOUNTER (EMERGENCY)
Facility: HOSPITAL | Age: 63
Discharge: HOME | End: 2023-02-08
Attending: EMERGENCY MEDICINE
Payer: COMMERCIAL

## 2023-02-08 VITALS
HEART RATE: 62 BPM | HEIGHT: 64 IN | DIASTOLIC BLOOD PRESSURE: 74 MMHG | WEIGHT: 192 LBS | BODY MASS INDEX: 32.78 KG/M2 | SYSTOLIC BLOOD PRESSURE: 135 MMHG | TEMPERATURE: 98 F | RESPIRATION RATE: 18 BRPM | OXYGEN SATURATION: 99 %

## 2023-02-08 DIAGNOSIS — R07.9 CHEST PAIN: ICD-10-CM

## 2023-02-08 DIAGNOSIS — U07.1 COVID-19 VIRUS DETECTED: ICD-10-CM

## 2023-02-08 DIAGNOSIS — U07.1 COVID-19: Primary | ICD-10-CM

## 2023-02-08 LAB
CTP QC/QA: YES
SARS-COV-2 RDRP RESP QL NAA+PROBE: POSITIVE

## 2023-02-08 PROCEDURE — 87635 SARS-COV-2 COVID-19 AMP PRB: CPT | Mod: ER | Performed by: NURSE PRACTITIONER

## 2023-02-08 PROCEDURE — 99284 EMERGENCY DEPT VISIT MOD MDM: CPT | Mod: ER

## 2023-02-08 RX ORDER — FLUTICASONE PROPIONATE 50 MCG
2 SPRAY, SUSPENSION (ML) NASAL DAILY
Qty: 15.8 ML | Refills: 0 | Status: SHIPPED | OUTPATIENT
Start: 2023-02-08 | End: 2023-03-10

## 2023-02-08 RX ORDER — BENZONATATE 200 MG/1
200 CAPSULE ORAL 3 TIMES DAILY PRN
Qty: 30 CAPSULE | Refills: 0 | Status: SHIPPED | OUTPATIENT
Start: 2023-02-08 | End: 2023-02-18

## 2023-02-08 RX ORDER — ALBUTEROL SULFATE 90 UG/1
1-2 AEROSOL, METERED RESPIRATORY (INHALATION) EVERY 4 HOURS PRN
Qty: 8 G | Refills: 0 | Status: SHIPPED | OUTPATIENT
Start: 2023-02-08 | End: 2024-02-08

## 2023-02-08 RX ORDER — ACETAMINOPHEN 500 MG
1000 TABLET ORAL EVERY 6 HOURS PRN
Qty: 56 TABLET | Refills: 0 | Status: SHIPPED | OUTPATIENT
Start: 2023-02-08 | End: 2023-02-15

## 2023-02-08 RX ORDER — GUAIFENESIN/DEXTROMETHORPHAN 100-10MG/5
5 SYRUP ORAL EVERY 6 HOURS PRN
Qty: 473 ML | Refills: 0 | Status: SHIPPED | OUTPATIENT
Start: 2023-02-08 | End: 2023-02-18

## 2023-02-08 NOTE — DISCHARGE INSTRUCTIONS
Thank you for coming to our Emergency Department today. It is important to remember that some problems are difficult to diagnose and may not be found during your first visit. Be sure to follow up with your primary care doctor and review any labs/imaging that was performed with them. If you do not have a primary care doctor, you may contact the one listed on your discharge paperwork or you may also call the [facility] Appointment Desk at [phone] to schedule an appointment with one.     All medications may potentially have side effects and it is impossible to predict which medications may give you side effects. If you feel that you are having a negative effect of any medication you should immediately stop taking them and seek medical attention.    Return to the ER with any questions/concerns, new/concerning symptoms, worsening or failure to improve. Do not drive or make any important decisions for 24 hours if you have received any pain medications, sedatives or mood altering drugs during your ER visit.

## 2023-02-08 NOTE — ED PROVIDER NOTES
Encounter Date: 2/8/2023    SCRIBE #1 NOTE: I, Telma Villanueva, am scribing for, and in the presence of,  Fernie Gonsales PA-C. I have scribed the following portions of the note - Other sections scribed: HPI & ROS..     History     Chief Complaint   Patient presents with    URI     Pt states hoarse voice, body aches, and chest tightness and chest congestion x2 days      Carrol Molina is a 62 y.o. female, with a PMHx of HTN, who presents to the ED with chest tightness, mild cough producing chest pain, ear pain bilaterally, hoarseness, post nasal drip, and upper back pain, onset 2 days ago. Patient reports her chest tightness is exacerbated when laying down. Per patient she had Covid 2 years ago but she never has these symptoms with chest pain. No other exacerbating or alleviating factors. Patient has travelled from [place] and is staying with her daughters family now. Patient is compliant with antihypertensives.      The history is provided by the patient.   Review of patient's allergies indicates:   Allergen Reactions    Codeine Hives    Diphenhydramine Other (See Comments)     chest pains  Other reaction(s): Other (See Comments), Unknown  chest pains  chest pains    Tolmetin Other (See Comments)     stomach pains.  Other reaction(s): Unknown  stomach pains.    Latex Itching and Swelling     Can the patient chew gum without allergic reation? yes  Can the patient blow up a balloon without reaction? No  Is the patient allergic to kiwi, bananas, avocado, or chestnuts? No  Immediate itch or reaction to latex gloves yes  Known allergy to latex (i.e. blood test)? Yes  Does the patient require special undergarments, such as panties/briefs with special waist bandno  Can the patient chew gum without allergic reation? yes  Can the patient blow up a balloon without reaction? No  Is the patient allergic to kiwi, bananas, avocado, or chestnuts? No  Immediate itch or reaction to latex gloves yes  Known allergy to  latex (i.e. blood test)? Yes  Does the patient require special undergarments, such as panties/briefs with special waist bandno  Can the patient chew gum without allergic reation? yes  Can the patient blow up a balloon without reaction? No  Is the patient allergic to kiwi, bananas, avocado, or chestnuts? No  Immediate itch or reaction to latex gloves yes  Known allergy to latex (i.e. blood test)? Yes  Does the patient require special undergarments, such as panties/briefs with special waist bandno    Metronidazole Other (See Comments)     Sores on inside of tongue     Past Medical History:   Diagnosis Date    Bipolar disorder     Hypertension      Past Surgical History:   Procedure Laterality Date    CHOLECYSTECTOMY  2008    COLON SURGERY  08/17/2018    HYSTERECTOMY  1984    MOUTH SURGERY  07/2017    oopherectomy  2005    TONSILLECTOMY  1984    TOTAL KNEE ARTHROPLASTY       bilateral     Family History   Problem Relation Age of Onset    Hypertension Mother     Heart disease Father     Heart disease Brother      Social History     Tobacco Use    Smoking status: Never    Smokeless tobacco: Never   Substance Use Topics    Alcohol use: No    Drug use: No     Review of Systems   Constitutional:  Negative for diaphoresis, fatigue and unexpected weight change.   HENT:  Positive for ear pain (bilaterally), postnasal drip and voice change (hoarseness). Negative for sinus pain and sore throat.    Eyes:  Negative for pain, redness and visual disturbance.   Respiratory:  Positive for cough (mild) and chest tightness. Negative for shortness of breath and wheezing.    Cardiovascular:  Negative for chest pain and palpitations.   Gastrointestinal:  Negative for abdominal pain, blood in stool, diarrhea, nausea and vomiting.   Endocrine: Negative for polydipsia, polyphagia and polyuria.   Genitourinary:  Negative for dysuria, frequency and urgency.   Musculoskeletal:  Positive for back pain (upper). Negative for arthralgias and  myalgias.   Skin:  Negative for rash.   Allergic/Immunologic: Negative for environmental allergies.   Neurological:  Negative for dizziness, syncope and headaches.   Psychiatric/Behavioral:  Negative for suicidal ideas.      Physical Exam     Initial Vitals [02/08/23 1450]   BP Pulse Resp Temp SpO2   (!) 153/79 66 20 98 °F (36.7 °C) 98 %      MAP       --         Physical Exam    Nursing note and vitals reviewed.  Constitutional: Vital signs are normal. She appears well-developed and well-nourished. She is cooperative. She does not appear ill. No distress.   HENT:   Head: Normocephalic and atraumatic.   Right Ear: Hearing and external ear normal.   Left Ear: Hearing and external ear normal.   Nose: Nose normal.   Eyes: Conjunctivae and EOM are normal.   Neck: Phonation normal.   Normal range of motion.  Cardiovascular:  Regular rhythm, S1 normal, S2 normal, normal heart sounds, intact distal pulses and normal pulses.   Bradycardia present.   Exam reveals no friction rub.       No murmur heard.  Pulses:       Radial pulses are 2+ on the right side and 2+ on the left side.   Pulmonary/Chest: Effort normal. No accessory muscle usage. No tachypnea. No respiratory distress. She has no decreased breath sounds. She has wheezes (Mild, diffuse). She has no rhonchi. She has no rales.   Abdominal: Abdomen is soft and flat. Bowel sounds are normal. She exhibits no distension. There is no abdominal tenderness. There is no guarding.   Musculoskeletal:      Cervical back: Normal range of motion.     Neurological: She is alert and oriented to person, place, and time. Gait normal. GCS eye subscore is 4. GCS verbal subscore is 5. GCS motor subscore is 6.   Skin: Skin is warm. Capillary refill takes less than 2 seconds. No rash noted. No pallor.       ED Course   Procedures  Labs Reviewed   SARS-COV-2 RDRP GENE - Abnormal; Notable for the following components:       Result Value    POC Rapid COVID Positive (*)     All other components  within normal limits    Narrative:     This test utilizes isothermal nucleic acid amplification technology to detect the SARS-CoV-2 RdRp nucleic acid segment. The analytical sensitivity (limit of detection) is 500 copies/swab.     A POSITIVE result is indicative of the presence of SARS-CoV-2 RNA; clinical correlation with patient history and other diagnostic information is necessary to determine patient infection status.    A NEGATIVE result means that SARS-CoV-2 nucleic acids are not present above the limit of detection. A NEGATIVE result should be treated as presumptive. It does not rule out the possibility of COVID-19 and should not be the sole basis for treatment decisions. If COVID-19 is strongly suspected based on clinical and exposure history, re-testing using an alternate molecular assay should be considered.     This test is only for use under the Food and Drug Administration s Emergency Use Authorization (EUA).     Commercial kits are provided by [employer]. Performance characteristics of the EUA have been independently verified by [facility] Department of Pathology and Laboratory Medicine.   _________________________________________________________________   The authorized Fact Sheet for Healthcare Providers and the authorized Fact Sheet for Patients of the ID NOW COVID-19 are available on the FDA website:    https://www.fda.gov/media/548188/download      https://www.fda.gov/media/063588/download             Imaging Results    None          Medications - No data to display  Medical Decision Making:   History:   Old Medical Records: I decided to obtain old medical records.  Initial Assessment:   62-year-old female presenting with upper respiratory symptoms  Differential Diagnosis:   Differential diagnosis includes but is not limited to respiratory infections including COVID, flu, bronchitis, rhinosinusitis, or pneumonia, or noninfectious processes such as asthma, COPD or seasonal  allergies.   Independently Interpreted Test(s):   I have ordered and independently interpreted EKG Reading(s) - see prior notes  Clinical Tests:   Lab Tests: Ordered and Reviewed  Medical Tests: Ordered and Reviewed  ED Management:  Patient presenting with upper respiratory symptoms for 3 days.  Productive cough, chest tightness.  On physical exam, patient is in no acute distress and all vital signs are within normal limits.  She was afebrile and bradycardic.  Oxygen saturation 100% during my exam.  Had the patient walk around the room while I monitored pulse oximetry, oxygen saturation did not fall below 98%.  She was in no distress during this time. She did not become tachycardic.  She did have diffuse mild wheezing on lung exam.  Safe to be discharged home, given numerous prescriptions for symptomatic management and prescribed albuterol inhaler for wheezing and chest tightness.    Patient was reporting chest tightness.  Worse with cough, reproducible with palpation, not worse with exertion.  History is not consistent with cardiac chest pain. EKG was obtained and showed right bundle-branch block but no ST elevation, ST-depression, or T-wave changes. Doubt acute coronary syndrome at this time, no further cardiac workup was initiated.  When I discussed these findings with the patient, she reports that she had been told by her cardiologist in the past that she had a right bundle branch block.  It was not present on her prior EKG.  Urged her to follow up with her cardiologist. No signs of focal consolidation such as dullness to percussion, decreased breath sounds, or adventitious sounds of breathing.  Doubt pneumonia, pneumothorax, hemothorax, fracture.    Return precautions were discussed, all patient questions were answered, and the patient was agreeable to the plan of care.  She was discharged home in stable condition and will follow up with her primary care provider or return to the emergency department if her  symptoms worsen or do not improve.         Scribe Attestation:   Scribe #1: I performed the above scribed service and the documentation accurately describes the services I performed. I attest to the accuracy of the note.                   Clinical Impression:   Final diagnoses:  [U07.1] COVID-19 (Primary)     I, Fernie Gonsales PA-C, personally performed the services described in this documentation.  All medical record entries made by the scribe were at my direction and in my presence.  I have reviewed the chart and agree that the record reflects my personal performance and is accurate and complete.    ED Disposition Condition    Discharge Stable          ED Prescriptions       Medication Sig Dispense Start Date End Date Auth. Provider    fluticasone propionate (FLONASE) 50 mcg/actuation nasal spray 2 sprays (100 mcg total) by Each Nostril route once daily. 15.8 mL 2/8/2023 3/10/2023 Fernie Gonsales PA-C    benzocaine-menthoL 6-10 mg lozenge Take 1 lozenge by mouth every 2 (two) hours as needed. 36 tablet 2/8/2023 -- Fernie Gonsales PA-C    dextromethorphan-guaiFENesin  mg/5 ml (ROBITUSSIN-DM)  mg/5 mL liquid Take 5 mLs by mouth every 6 (six) hours as needed (cough). 473 mL 2/8/2023 2/18/2023 Fernie Gonsales PA-C    benzonatate (TESSALON) 200 MG capsule Take 1 capsule (200 mg total) by mouth 3 (three) times daily as needed for Cough. 30 capsule 2/8/2023 2/18/2023 Fernie Gonsales PA-C    acetaminophen (TYLENOL) 500 MG tablet Take 2 tablets (1,000 mg total) by mouth every 6 (six) hours as needed for Pain. 56 tablet 2/8/2023 2/15/2023 Fernie Gonsales PA-C    albuterol (PROVENTIL/VENTOLIN HFA) 90 mcg/actuation inhaler Inhale 1-2 puffs into the lungs every 4 (four) hours as needed for Wheezing or Shortness of Breath. Rescue 8 g 2/8/2023 2/8/2024 Fernie D. Lobell, PA-C          Follow-up Information       Follow up With Specialties Details Why Contact Info    Dillan Resendez MD Family Medicine  Schedule an appointment as soon as possible for a visit in 1 week If symptoms worsen [id number]  [facility] [id number]  [phone]      [facility] ED Emergency Medicine Go to  If symptoms worsen [address] [facility] [id number] [phone]             Fernie Gonsales PA-C  02/08/23 1714       Fernie Gonsales PA-C  02/08/23 1715

## 2023-02-09 ENCOUNTER — NURSE TRIAGE (OUTPATIENT)
Dept: ADMINISTRATIVE | Facility: CLINIC | Age: 63
End: 2023-02-09
Payer: COMMERCIAL

## 2023-02-09 ENCOUNTER — HOSPITAL ENCOUNTER (EMERGENCY)
Facility: HOSPITAL | Age: 63
Discharge: HOME | End: 2023-02-09
Attending: STUDENT IN AN ORGANIZED HEALTH CARE EDUCATION/TRAINING PROGRAM
Payer: COMMERCIAL

## 2023-02-09 VITALS
WEIGHT: 192 LBS | DIASTOLIC BLOOD PRESSURE: 60 MMHG | RESPIRATION RATE: 22 BRPM | SYSTOLIC BLOOD PRESSURE: 138 MMHG | HEIGHT: 64 IN | BODY MASS INDEX: 32.78 KG/M2 | HEART RATE: 79 BPM | TEMPERATURE: 100 F | OXYGEN SATURATION: 98 %

## 2023-02-09 DIAGNOSIS — U07.1 COVID-19: ICD-10-CM

## 2023-02-09 DIAGNOSIS — I26.99 ACUTE PULMONARY EMBOLISM WITHOUT ACUTE COR PULMONALE, UNSPECIFIED PULMONARY EMBOLISM TYPE: Primary | ICD-10-CM

## 2023-02-09 LAB
ALBUMIN SERPL-MCNC: 3.9 G/DL (ref 3.3–5.5)
ALLENS TEST: ABNORMAL
ALP SERPL-CCNC: 66 U/L (ref 42–141)
BILIRUB SERPL-MCNC: 0.7 MG/DL (ref 0.2–1.6)
BUN SERPL-MCNC: 11 MG/DL (ref 7–22)
CALCIUM SERPL-MCNC: 10.2 MG/DL (ref 8–10.3)
CHLORIDE SERPL-SCNC: 113 MMOL/L (ref 98–108)
CK SERPL-CCNC: 39 U/L (ref 20–180)
CREAT SERPL-MCNC: 1.2 MG/DL (ref 0.6–1.2)
CRP SERPL-MCNC: 26 MG/L (ref 0–8.2)
FERRITIN SERPL-MCNC: 208 NG/ML (ref 20–300)
GLUCOSE SERPL-MCNC: 105 MG/DL (ref 73–118)
HCO3 UR-SCNC: 23.1 MMOL/L (ref 24–28)
LDH SERPL L TO P-CCNC: 0.86 MMOL/L (ref 0.5–2.2)
PCO2 BLDA: 40.2 MMHG (ref 35–45)
PH SMN: 7.37 [PH] (ref 7.35–7.45)
PO2 BLDA: 32 MMHG (ref 40–60)
POC ALT (SGPT): 18 U/L (ref 10–47)
POC AST (SGOT): 19 U/L (ref 11–38)
POC B-TYPE NATRIURETIC PEPTIDE: 82.2 PG/ML (ref 0–100)
POC BE: -2 MMOL/L
POC CARDIAC TROPONIN I: 0.01 NG/ML (ref 0–0.08)
POC D-DI: 954 NG/ML (ref 0–450)
POC PTINR: 1.7 (ref 0.9–1.2)
POC PTWBT: 20.4 SEC (ref 9.7–14.3)
POC SATURATED O2: 61 % (ref 95–100)
POC TCO2: 24 MMOL/L (ref 24–29)
POC TCO2: 26 MMOL/L (ref 18–33)
POTASSIUM BLD-SCNC: 3.9 MMOL/L (ref 3.6–5.1)
PROCALCITONIN SERPL IA-MCNC: 0.08 NG/ML
PROTEIN, POC: 7.1 G/DL (ref 6.4–8.1)
SAMPLE: ABNORMAL
SAMPLE: ABNORMAL
SAMPLE: NORMAL
SITE: ABNORMAL
SODIUM BLD-SCNC: 146 MMOL/L (ref 128–145)

## 2023-02-09 PROCEDURE — 83880 ASSAY OF NATRIURETIC PEPTIDE: CPT | Mod: ER

## 2023-02-09 PROCEDURE — 99285 EMERGENCY DEPT VISIT HI MDM: CPT | Mod: 25,ER

## 2023-02-09 PROCEDURE — 93005 ELECTROCARDIOGRAM TRACING: CPT | Mod: ER

## 2023-02-09 PROCEDURE — 25000242 PHARM REV CODE 250 ALT 637 W/ HCPCS: Mod: ER | Performed by: STUDENT IN AN ORGANIZED HEALTH CARE EDUCATION/TRAINING PROGRAM

## 2023-02-09 PROCEDURE — 63600175 PHARM REV CODE 636 W HCPCS: Mod: ER | Performed by: STUDENT IN AN ORGANIZED HEALTH CARE EDUCATION/TRAINING PROGRAM

## 2023-02-09 PROCEDURE — 96374 THER/PROPH/DIAG INJ IV PUSH: CPT | Mod: ER

## 2023-02-09 PROCEDURE — 84145 PROCALCITONIN (PCT): CPT | Performed by: NURSE PRACTITIONER

## 2023-02-09 PROCEDURE — 87040 BLOOD CULTURE FOR BACTERIA: CPT | Mod: 59 | Performed by: NURSE PRACTITIONER

## 2023-02-09 PROCEDURE — 80053 COMPREHEN METABOLIC PANEL: CPT | Mod: ER

## 2023-02-09 PROCEDURE — 85025 COMPLETE CBC W/AUTO DIFF WBC: CPT | Mod: ER

## 2023-02-09 PROCEDURE — 85379 FIBRIN DEGRADATION QUANT: CPT | Mod: ER

## 2023-02-09 PROCEDURE — 82728 ASSAY OF FERRITIN: CPT | Performed by: NURSE PRACTITIONER

## 2023-02-09 PROCEDURE — 84484 ASSAY OF TROPONIN QUANT: CPT | Mod: ER

## 2023-02-09 PROCEDURE — 93010 ELECTROCARDIOGRAM REPORT: CPT | Mod: ,,, | Performed by: INTERNAL MEDICINE

## 2023-02-09 PROCEDURE — 85610 PROTHROMBIN TIME: CPT | Mod: ER

## 2023-02-09 PROCEDURE — 86140 C-REACTIVE PROTEIN: CPT | Performed by: NURSE PRACTITIONER

## 2023-02-09 PROCEDURE — 25500020 PHARM REV CODE 255: Mod: ER | Performed by: STUDENT IN AN ORGANIZED HEALTH CARE EDUCATION/TRAINING PROGRAM

## 2023-02-09 PROCEDURE — 82550 ASSAY OF CK (CPK): CPT | Performed by: NURSE PRACTITIONER

## 2023-02-09 PROCEDURE — 94640 AIRWAY INHALATION TREATMENT: CPT | Mod: ER

## 2023-02-09 PROCEDURE — 82803 BLOOD GASES ANY COMBINATION: CPT | Mod: ER

## 2023-02-09 PROCEDURE — 96372 THER/PROPH/DIAG INJ SC/IM: CPT | Mod: 59 | Performed by: STUDENT IN AN ORGANIZED HEALTH CARE EDUCATION/TRAINING PROGRAM

## 2023-02-09 PROCEDURE — 93010 EKG 12-LEAD: ICD-10-PCS | Mod: ,,, | Performed by: INTERNAL MEDICINE

## 2023-02-09 RX ORDER — ENOXAPARIN SODIUM 100 MG/ML
1 INJECTION SUBCUTANEOUS
Status: COMPLETED | OUTPATIENT
Start: 2023-02-09 | End: 2023-02-09

## 2023-02-09 RX ORDER — ENOXAPARIN SODIUM 100 MG/ML
1 INJECTION SUBCUTANEOUS EVERY 12 HOURS
Qty: 162 ML | Refills: 0 | Status: SHIPPED | OUTPATIENT
Start: 2023-02-09 | End: 2023-05-10

## 2023-02-09 RX ORDER — IPRATROPIUM BROMIDE AND ALBUTEROL SULFATE 2.5; .5 MG/3ML; MG/3ML
3 SOLUTION RESPIRATORY (INHALATION) ONCE
Status: COMPLETED | OUTPATIENT
Start: 2023-02-09 | End: 2023-02-09

## 2023-02-09 RX ADMIN — IOHEXOL 100 ML: 350 INJECTION, SOLUTION INTRAVENOUS at 05:02

## 2023-02-09 RX ADMIN — ENOXAPARIN SODIUM 90 MG: 100 INJECTION SUBCUTANEOUS at 06:02

## 2023-02-09 RX ADMIN — IPRATROPIUM BROMIDE AND ALBUTEROL SULFATE 3 ML: .5; 3 SOLUTION RESPIRATORY (INHALATION) at 05:02

## 2023-02-09 NOTE — TELEPHONE ENCOUNTER
Pt. Responded to [facility] text message. Pt reports that she spoke with nurse in clinic she was seen at yesterday. Sates that she was trying to obtain nebulizer machine. Will send message to provider  Reason for Disposition   Caller has already spoken with another triager or PCP (or office), and has further questions and triager able to answer questions.    Protocols used: No Contact or Duplicate Contact Call-A-[place]

## 2023-02-09 NOTE — ED PROVIDER NOTES
Encounter Date: 2/9/2023    SCRIBE #1 NOTE: I, Kami Guidry, am scribing for, and in the presence of,  Roselyn Xiong DO.     History     Chief Complaint   Patient presents with    COVID-19 Concerns     Pt DX with COVID yesterday pt states the prescribed puffs are not helping break up her chest congestion pt req breathing treatment pt states body aches and SOB on exertion      62 y.o. female with PMHx of HTN and Bipolar disorder who presents to the ED for chief complaint of upper back pain, lower chest pain, and worsening SOB after being diagnosed with COVID yesterday. Patient was seen here yesterday for a 2 day history of URI symptoms and diagnosed with COVID. She has been treating her symptoms with prescribed albuterol inhaler and Tylenol with no relief of her SOB. She endorses SOB at rest that is worsened upon exertion. Patient rates her back pain and chest pain as 8.5/10. She additionally endorses fever (Tmax 101) and 1 episode of lightheadedness earlier today which prompted her to come to the ED. Denies leg swelling, nausea, vomiting, diarrhea, hemoptysis or other associated symptoms.     The history is provided by the patient. No  was used.   Review of patient's allergies indicates:   Allergen Reactions    Codeine Hives    Diphenhydramine Other (See Comments)     chest pains  Other reaction(s): Other (See Comments), Unknown  chest pains  chest pains    Tolmetin Other (See Comments)     stomach pains.  Other reaction(s): Unknown  stomach pains.    Latex Itching and Swelling     Can the patient chew gum without allergic reation? yes  Can the patient blow up a balloon without reaction? No  Is the patient allergic to kiwi, bananas, avocado, or chestnuts? No  Immediate itch or reaction to latex gloves yes  Known allergy to latex (i.e. blood test)? Yes  Does the patient require special undergarments, such as panties/briefs with special waist bandno  Can the patient chew gum without  allergic reation? yes  Can the patient blow up a balloon without reaction? No  Is the patient allergic to kiwi, bananas, avocado, or chestnuts? No  Immediate itch or reaction to latex gloves yes  Known allergy to latex (i.e. blood test)? Yes  Does the patient require special undergarments, such as panties/briefs with special waist bandno  Can the patient chew gum without allergic reation? yes  Can the patient blow up a balloon without reaction? No  Is the patient allergic to kiwi, bananas, avocado, or chestnuts? No  Immediate itch or reaction to latex gloves yes  Known allergy to latex (i.e. blood test)? Yes  Does the patient require special undergarments, such as panties/briefs with special waist bandno    Metronidazole Other (See Comments)     Sores on inside of tongue     Past Medical History:   Diagnosis Date    Bipolar disorder     Hypertension      Past Surgical History:   Procedure Laterality Date    CHOLECYSTECTOMY  2008    COLON SURGERY  08/17/2018    HYSTERECTOMY  1984    MOUTH SURGERY  07/2017    oopherectomy  2005    TONSILLECTOMY  1984    TOTAL KNEE ARTHROPLASTY       bilateral     Family History   Problem Relation Age of Onset    Hypertension Mother     Heart disease Father     Heart disease Brother      Social History     Tobacco Use    Smoking status: Never    Smokeless tobacco: Never   Substance Use Topics    Alcohol use: No    Drug use: No     Review of Systems   Constitutional:  Positive for fever. Negative for chills.   HENT:  Negative for drooling, facial swelling and trouble swallowing.    Eyes:  Negative for redness and visual disturbance.   Respiratory:  Positive for shortness of breath. Negative for cough and stridor.         (-) Hemoptysis.   Cardiovascular:  Positive for chest pain. Negative for leg swelling.   Gastrointestinal:  Positive for abdominal pain. Negative for diarrhea, nausea and vomiting.   Genitourinary:  Negative for dysuria and hematuria.   Musculoskeletal:  Negative for  gait problem and neck stiffness.   Skin:  Negative for pallor and wound.   Neurological:  Positive for light-headedness. Negative for syncope, facial asymmetry, speech difficulty and weakness.   Psychiatric/Behavioral:  Negative for confusion.    All other systems reviewed and are negative.    Physical Exam     Initial Vitals [02/09/23 1526]   BP Pulse Resp Temp SpO2   135/79 90 (!) 22 99.7 °F (37.6 °C) (!) 92 %      MAP       --         Physical Exam    Nursing note and vitals reviewed.  Constitutional: Vital signs are normal. She appears well-developed. She is not diaphoretic.  Non-toxic appearance. She appears ill.   HENT:   Head: Normocephalic and atraumatic.   Mouth/Throat: Uvula is midline, oropharynx is clear and moist and mucous membranes are normal.   Eyes: Conjunctivae are normal. No scleral icterus.   Neck: Neck supple. No JVD present.   Normal range of motion.  Cardiovascular:  Normal rate, regular rhythm, normal heart sounds and intact distal pulses.           No murmur heard.  Pulmonary/Chest: No respiratory distress.   Decreased breath sounds diffusely.   Abdominal: Abdomen is soft. She exhibits no distension. There is no abdominal tenderness. There is no rebound and no guarding.   Musculoskeletal:         General: No tenderness or edema. Normal range of motion.      Cervical back: Normal range of motion and neck supple. No rigidity. No spinous process tenderness.      Comments: Walking boot to RLE.     Neurological: She is alert. She has normal strength. She displays a negative Romberg sign.   Moves all extremities, follows all commands, no focal neurologic deficits.     Skin: Skin is warm and dry. No rash noted. No erythema.   Psychiatric: She has a normal mood and affect.       ED Course   Critical Care    Date/Time: 2/9/2023 6:56 PM  Performed by: Roselyn Xiogn DO  Authorized by: Roselyn Xiong DO   Direct patient critical care time: 35 minutes  Additional history critical  care time: 10 minutes  Ordering / reviewing critical care time: 10 minutes  Documentation critical care time: 10 minutes  Consult with family critical care time: 10 minutes  Total critical care time (exclusive of procedural time) : 75 minutes  Critical care was necessary to treat or prevent imminent or life-threatening deterioration of the following conditions: circulatory failure.  Critical care was time spent personally by me on the following activities: development of treatment plan with patient or surrogate, discussions with consultants, evaluation of patient's response to treatment, examination of patient, ordering and performing treatments and interventions, obtaining history from patient or surrogate, ordering and review of laboratory studies, ordering and review of radiographic studies, pulse oximetry, re-evaluation of patient's condition and review of old charts.      Labs Reviewed   C-REACTIVE PROTEIN - Abnormal; Notable for the following components:       Result Value    CRP 26.0 (*)     All other components within normal limits   ISTAT PROCEDURE - Abnormal; Notable for the following components:    POC PTWBT 20.4 (*)     POC PTINR 1.7 (*)     All other components within normal limits   POCT CMP - Abnormal; Notable for the following components:    POC Chloride 113 (*)     POC Sodium 146 (*)     All other components within normal limits   ISTAT PROCEDURE - Abnormal; Notable for the following components:    POC PO2 32 (*)     POC HCO3 23.1 (*)     POC SATURATED O2 61 (*)     All other components within normal limits   POCT D DIMER - Abnormal; Notable for the following components:    POC D- (*)     All other components within normal limits   CULTURE, BLOOD    Narrative:     Aerobic and anaerobic   CULTURE, BLOOD    Narrative:     Aerobic and anaerobic   FERRITIN   CK   PROCALCITONIN   TROPONIN ISTAT   POCT CBC   POCT CMP   POCT TROPONIN   POCT PROTIME-INR   POCT B-TYPE NATRIURETIC PEPTIDE (BNP)   POCT D  DIMER   POCT B-TYPE NATRIURETIC PEPTIDE (BNP)       EKG Readings: (Independently Interpreted)   Normal sinus rhythm with a rate of 74 bpm, RBBB, no acte ST segment changes. EKG grossly unchanged when compared to previous EKG from 2/8/2023     Imaging Results               CTA Chest Non-Coronary (PE Studies) (Final result)  Result time 02/09/23 17:39:34      Final result by Linsey Fishman MD (02/09/23 17:39:34)                   Impression:      Acute pulmonary thromboembolus within right lower lobe lateral segmental branch.    This report was flagged in Epic as abnormal.      Electronically signed by: Linsey Fishman MD  Date:    02/09/2023  Time:    17:39               Narrative:    EXAMINATION:  CTA CHEST NON CORONARY (PE STUDIES)    CLINICAL HISTORY:  Pulmonary embolism (PE) suspected, positive D-dimer;    TECHNIQUE:  Low dose axial images, sagittal and coronal reformations were obtained from the thoracic inlet to the lung bases following the IV administration of 100 mL of Omnipaque 350.  Contrast timing was optimized to evaluate the pulmonary arteries.  MIP images were performed.    COMPARISON:  None    FINDINGS:  Structures at the base of the neck are unremarkable.  Aorta is non-aneurysmal.  The heart is normal in size without pericardial effusion.  Intraluminal filling defect consistent with pulmonary thromboembolus is seen within right lower lobe lateral segmental branch.  No additional pulmonary thromboemboli or more central PE seen.  There is no evidence of mediastinal, axillary, or hilar lymph node enlargement.  The esophagus is unremarkable along its course.    The trachea and bronchi are patent.  The lungs are symmetrically expanded.  Minimal scattered plate atelectasis or scarring is seen.  Lungs show no consolidation or pleural effusion.  No evidence of pulmonary hemorrhage or infarction.    The visualized abdominal structures show no acute abnormalities.  Gallbladder has been removed.  Osseous  structures demonstrate degenerative change.  Extrathoracic soft tissues are unremarkable.                                       X-Ray Chest AP Portable (Final result)  Result time 02/09/23 16:44:29      Final result by Abraham Randhawa MD (02/09/23 16:44:29)                   Impression:      Left costophrenic angle new increased blunting which may reflect pleural thickening/scarring versus trace pleural effusion.  No consolidative process.      Electronically signed by: Abraham Randhawa MD  Date:    02/09/2023  Time:    16:44               Narrative:    EXAMINATION:  XR CHEST AP PORTABLE    CLINICAL HISTORY:  COVID-19;    TECHNIQUE:  Single frontal view of the chest was performed.    COMPARISON:  Chest radiograph 02/04/2010 and CT renal stone study 04/25/2008    FINDINGS:  Patient is slightly rotated.  Resolution is somewhat limited by body habitus with underpenetration.    Similar slight nonspecific elevation of the right hemidiaphragm.  There is new increased blunting of the left costophrenic angle.  Left greater than right basilar minimal platelike scarring versus atelectasis.  The lungs are otherwise well expanded without consolidation or pneumothorax.  No sizable pleural effusion on the right.    The cardiac silhouette is normal in size. Pulmonary vasculature and hilar and mediastinal contours are within normal limits.    No acute osseous process seen.  PA and lateral views can be obtained.                                       Medications   albuterol-ipratropium 2.5 mg-0.5 mg/3 mL nebulizer solution 3 mL (3 mLs Nebulization Given 2/9/23 1731)   iohexoL (OMNIPAQUE 350) injection 100 mL (100 mLs Intravenous Given 2/9/23 1727)   enoxaparin injection 90 mg (90 mg Subcutaneous Given 2/9/23 1824)     Medical Decision Making:   History:   Old Medical Records: I decided to obtain old medical records.  Independently Interpreted Test(s):   I have ordered and independently interpreted EKG Reading(s) - see prior  notes  Clinical Tests:   Lab Tests: Ordered and Reviewed  Radiological Study: Ordered and Reviewed  Medical Tests: Ordered and Reviewed  ED Management:   [facility]  This is an emergent evaluation of a 62 y.o. F with PMHx of HTN and Bipolar disorder who presents to the ED for chief complaint of upper back pain, lower chest pain, and worsening SOB after being diagnosed with COVID yesterday. Initial vitals in the ED   BP: 135/79  Pulse: 90  Resp: (!) 22  Temp: 99.7 °F (37.6 °C)  SpO2: (!) 92 % .     Physical exam noted above. DDx includes but is not limited to PE, ACS, Pleural effusion, CHF. Also considered but clinically less likely to be Dissection. Will obtain labs and imaging including CXR, EKG, D-dimer, Troponin, CMP, CBC, CMP. Will also provide Duoneb. Will continue to monitor and frequently reassess pending results of labs, treatments and final disposition.    Patient is aware of plan and is amenable.     Roselyn Xiong D.O  EMERGENCY MEDICINE  4:34 PM 02/09/2023    UPDATE:  Discharge Dot Phrase    Patient presents for emergent evaluation of acute shorthness of breath that poses a threat to life and/or bodily function.    In the ED patient found to have acute PE.    I ordered labs and personally reviewed them. Labs significant for an elevated D dimer, CRP.    I ordered X-rays and personally reviewed them and reviewed the radiologist interpretation. Xray significant for left costophrenic angle new increased blunting which may reflect pleural thickening/scarring versus trace pleural effusion.    I ordered EKG and personally reviewed it. EKG significant for no acute ischemic changes.    I ordered CTA scan and personally reviewed it and reviewed the radiologist interpretation. CTA significant for an acute pulmonary thromboembolus within right lower lobe lateral segmental branch.    I discussed the patient presentation labs, ekg, X-rays, CT findings with the patient and her .    Patient was managed in the ED  with Lovenox, Duoneb treatments.    The response to treatment was improvement of symptoms. Patient ambulatory pulse ox greater than 92%.    Patient was discharged in stable condition with Lovenox, Paxlovid, PCP follow-up. Detailed return precautions discussed.    This chart was completed using dictation software, as a result there may be some transcription errors        Scribe Attestation:   Scribe #1: I performed the above scribed service and the documentation accurately describes the services I performed. I attest to the accuracy of the note.                 I, Roselyn Xiong DO, personally performed the services described in this documentation.  All medical record entries made by the scribe were at my direction and in my presence.  I have reviewed the chart and agree that the record reflects my personal performance and is accurate and complete.  Clinical Impression:   Final diagnoses:  [U07.1] COVID-19  [I26.99] Acute pulmonary embolism without acute cor pulmonale, unspecified pulmonary embolism type (Primary)        ED Disposition Condition    Discharge Stable          ED Prescriptions       Medication Sig Dispense Start Date End Date Auth. Provider    nirmatrelvir-ritonavir 300 mg (150 mg x 2)-100 mg copackaged tablets (EUA) () Take 3 tablets by mouth 2 (two) times daily for 5 days. Each dose contains 2 nirmatrelvir (pink tablets) and 1 ritonavir (white tablet). Take all 3 tablets together 30 tablet 2023 Roselyn Xiong DO    enoxaparin (LOVENOX) 80 mg/0.8 mL Syrg Inject 0.9 mLs (90 mg total) into the skin every 12 (twelve) hours. 162 mL 2023 5/10/2023 Roselyn Xiong DO          Follow-up Information       Follow up With Specialties Details Why Contact Info    Dillan Resendez MD Family Medicine Schedule an appointment as soon as possible for a visit in 1 week Emergency Room Follow-up [id number]  [facility] [id number] [phone]       Please go to the closest ER  Go to  If symptoms worsen              Roselyn Xiong, DO  02/16/23 0259

## 2023-02-10 NOTE — DISCHARGE INSTRUCTIONS
Thank you for coming to our Emergency Department today. It is important to remember that some problems or medical conditions are difficult to diagnose and may not be found during your Emergency Department visit.     Be sure to follow up with your primary care doctor and review all labs/imaging/tests that were performed during your ER visit with them. Some labs/tests may be outside of the normal range and require non-emergent follow-up and further investigation to help diagnose/exclude/prevent complications or other potentially serious medical conditions that were not addressed during your ER visit.    If you do not have a primary care doctor, you may contact the one listed on your discharge paperwork or you may also call the [facility] Appointment Desk at [phone] to schedule an appointment and establish care with one. It is important to your health that you have a primary care doctor.    Please take all medications as directed. All medications may potentially have side-effects and it is impossible to predict which medications may give you side-effects or what side-effects (if any) they will give you.. If you feel that you are having a negative effect or side-effect of any medication you should immediately stop taking them and seek medical attention. If you feel that you are having a life-threatening reaction call 911.    Return to the ER with any questions/concerns, new/concerning symptoms, worsening or failure to improve.     Do not drive, swim, climb to height, take a bath, operate heavy machinery, drink alcohol or take potentially sedating medications, sign any legal documents or make any important decisions for 24 hours if you have received any pain medications, sedatives or mood altering drugs during your ER visit or within 24 hours of taking them if they have been prescribed to you.     You can find additional resources for Dentists, hearing aids, durable medical equipment, low cost pharmacies and  other resources at https://[facility].org    BELOW THIS LINE ONLY APPLIES IF YOU HAVE A COVID TEST PENDING OR IF YOU HAVE BEEN DIAGNOSED WITH COVID:  Please access [facility] to review the results of your test. Until the results of your COVID test return, you should isolate yourself so as not to potentially spread illness to others.   If your COVID test returns positive, you should isolate yourself so as not to spread illness to others. After five full days, if you are feeling better and you have not had fever for 24 hours, you can return to your typical daily activities, but you must wear a mask around others for an additional 5 days.   If your COVID test returns negative and you are either unvaccinated or more than six months out from your two-dose vaccine and are not yet boosted, you should still quarantine for 5 full days followed by strict mask use for an additional 5 full days.   If your COVID test returns negative and you have received your 2-dose initial vaccine as well as a booster, you should continue strict mask use for 10 full days after the exposure.  For all those exposed, best practice includes a test at day 5 after the exposure. This can be a home test or a test through one of the many testing centers throughout our community.   Masking is always advised to limit the spread of COVID. Cdc.gov is an excellent site to obtain the latest up to date recommendations regarding COVID and COVID testing.     CDC Testing and Quarantine Guidelines for patients with exposure to a known-positive COVID-19 person:  A close exposure is defined as anyone who has had an exposure (masked or unmasked) to a known COVID -19 positive person within 6 feet of someone for a cumulative total of 15 minutes or more over a 24-hour period.   Vaccinated and/or if you recently had a positive covid test within 90 days do NOT need to quarantine after contact with someone who had COVID-19 unless you develop symptoms.   Fully vaccinated  people who have not had a positive test within 90 days, should get tested 3-5 days after their exposure, even if they don't have symptoms and wear a mask indoors in public for 14 days following exposure or until their test result is negative.      Unvaccinated and/or NOT had a positive test within 90 days and meet close exposure  You are required by CDC guidelines to quarantine for at least 5 days from time of exposure followed by 5 days of strict masking. It is recommended, but not required to test after 5 days, unless you develop symptoms, in which case you should test at that time.  If you get tested after 5 days and your test is positive, your 5 day period of isolation starts the day of the positive test.    If your exposure does not meet the above definition, you can return to your normal daily activities to include social distancing, wearing a mask and frequent handwashing.      Here is a link to guidance from the CDC:  https://www.cdc.gov/media/releases/2021/s1227-isolation-quarantine-guidance.html      [place] Dept Of Health Testing Sites:  https://ldh.la.gov/page/3934      [facility] website with testing locations and guidance:  [email or web address]

## 2023-02-13 LAB
BACTERIA BLD CULT: NORMAL
BACTERIA BLD CULT: NORMAL

## 2023-02-15 ENCOUNTER — NURSE TRIAGE (OUTPATIENT)
Dept: ADMINISTRATIVE | Facility: CLINIC | Age: 63
End: 2023-02-15
Payer: COMMERCIAL

## 2023-02-15 NOTE — TELEPHONE ENCOUNTER
[facility] pt. CV + on 2/8 in ER.     Says feeling a little better, but now having diarrhea since Monday 2/13. Says 5-6 episodes daily. Unsure if related to new blood thinner she started Monday or paxlovid she just completed. Still drinking plenty fluids & urinating per usual.     Dispo-be seen in clinic today. Discussed home care, isolation precautions & callback symptoms. Pt vu. Pt declines alternative options to be seen, states will reach out to her local PCP for further guidance or f/u care.     Reason for Disposition   MODERATE diarrhea (e.g., 4-6 times / day more than normal) and present > 48 hours (2 days)   COVID-19 Home Isolation, questions about    Additional Information   Negative: Shock suspected (e.g., cold/pale/clammy skin, too weak to stand, low BP, rapid pulse)   Negative: Difficult to awaken or acting confused (e.g., disoriented, slurred speech)   Negative: Sounds like a life-threatening emergency to the triager   Negative: SEVERE abdominal pain (e.g., excruciating) and present > 1 hour   Negative: SEVERE abdominal pain and age > 60 years   Negative: Bloody, black, or tarry bowel movements (Exception: chronic-unchanged black-grey bowel movements and is taking iron pills or Pepto-Bismol)   Negative: SEVERE diarrhea (e.g., 7 or more times / day more than normal) and age > 60 years   Negative: Constant abdominal pain lasting > 2 hours   Negative: Drinking very little and has signs of dehydration (e.g., no urine > 12 hours, very dry mouth, very lightheaded)   Negative: Patient sounds very sick or weak to the triager   Negative: SEVERE diarrhea (e.g., 7 or more times / day more than normal) and present > 24 hours (1 day)    Protocols used: Diarrhea-A-OH, Coronavirus (COVID-19) Diagnosed or [id number]

## 2024-10-17 ENCOUNTER — TELEPHONE (OUTPATIENT)
Dept: NEUROSURGERY | Facility: CLINIC | Age: 64
End: 2024-10-17
Payer: COMMERCIAL

## 2024-10-17 NOTE — TELEPHONE ENCOUNTER
----- Message from Ania sent at 10/17/2024  2:02 PM CDT -----  Regarding: Appt Advise  Contact: [phone]  YULIET VARGHESE calling regarding Patient Advice (message) for #  pt is needing Tessa to call her back regarding appt tomorrow pt has been waiting since 1pm but no one has reached

## 2024-10-17 NOTE — TELEPHONE ENCOUNTER
Dr. Ibarra received message from  Dr. Yang to see pt for aneurysm. Called and spoke with pt. Appt scheduled for 10/18/24.

## 2024-10-18 ENCOUNTER — OFFICE VISIT (OUTPATIENT)
Dept: NEUROSURGERY | Facility: CLINIC | Age: 64
End: 2024-10-18
Payer: COMMERCIAL

## 2024-10-18 DIAGNOSIS — I67.1 CEREBRAL ANEURYSM, NONRUPTURED: Primary | ICD-10-CM

## 2024-10-18 PROCEDURE — 99214 OFFICE O/P EST MOD 30 MIN: CPT | Mod: 95,,, | Performed by: NEUROLOGICAL SURGERY

## 2024-10-18 PROCEDURE — 4010F ACE/ARB THERAPY RXD/TAKEN: CPT | Mod: CPTII,95,, | Performed by: NEUROLOGICAL SURGERY

## 2024-10-23 ENCOUNTER — TELEPHONE (OUTPATIENT)
Dept: NEUROSURGERY | Facility: CLINIC | Age: 64
End: 2024-10-23
Payer: COMMERCIAL

## 2024-10-23 NOTE — TELEPHONE ENCOUNTER
Returned call to pt. Informed that we are waiting for Dr. Ibarra to finished his note. Will let pt know once I have the appointment scheduled. Pt requested everything to be scheduled on the same date.

## 2024-10-23 NOTE — TELEPHONE ENCOUNTER
----- Message from Lucia sent at 10/23/2024  8:44 AM CDT -----  Regarding: appt access  Contact: [phone]  Patient calling to schedule appt for testing. Pls call

## 2024-10-25 ENCOUNTER — TELEPHONE (OUTPATIENT)
Dept: NEUROSURGERY | Facility: CLINIC | Age: 64
End: 2024-10-25
Payer: COMMERCIAL

## 2024-10-25 NOTE — TELEPHONE ENCOUNTER
----- Message from Emily sent at 10/25/2024  9:31 AM CDT -----  Regarding: Consult/Advisory          Name Of Caller:   Carrol      Contact Preference:   Home #: [phone], Cell #: [phone]       Nature of call:   Pt would like to speak with nurse regarding the upcoming CTA scan and brain MRI. She has some concerns.

## 2024-10-25 NOTE — TELEPHONE ENCOUNTER
Returned call to pt. Explained waiting on note from Dr. Ibarra. Sent message to Dr. Ibarra. Will let her know and schedule accordingly once completed. Pt yana.

## 2024-10-29 ENCOUNTER — TELEPHONE (OUTPATIENT)
Dept: NEUROSURGERY | Facility: CLINIC | Age: 64
End: 2024-10-29
Payer: COMMERCIAL

## 2024-10-29 DIAGNOSIS — I67.1 CEREBRAL ANEURYSM, NONRUPTURED: Primary | ICD-10-CM

## 2024-11-05 ENCOUNTER — TELEPHONE (OUTPATIENT)
Dept: NEUROSURGERY | Facility: CLINIC | Age: 64
End: 2024-11-05
Payer: COMMERCIAL

## 2024-11-05 NOTE — TELEPHONE ENCOUNTER
----- Message from Ania sent at 11/5/2024  3:29 PM CST -----  Regarding: Lab work advise  Contact: [phone]  Carrol Molina calling regarding Patient Advice (message) for # pt is calling to speak with nurse regarding the pt lab work and when she can do it she just needs clarification pls advise  
Returned call. Explained they do a finger stick when the CT is done. Pt yana.   
done

## 2024-11-15 ENCOUNTER — TELEPHONE (OUTPATIENT)
Dept: NEUROSURGERY | Facility: CLINIC | Age: 64
End: 2024-11-15
Payer: COMMERCIAL

## 2024-11-15 NOTE — TELEPHONE ENCOUNTER
Returned call to pt. Pt stated that she received a call form her insurance regarding to the MRI/CTA on Monday. Stated that she will have them done on Monday.

## 2024-11-15 NOTE — TELEPHONE ENCOUNTER
----- Message from Martinerikluysses sent at 11/15/2024  2:50 PM CST -----  Regarding: pt advice  Contact: pt @ [phone]  Type:  Needs Medical Advice    Who Called:  Carrol     Symptoms (please be specific): brain aneurysm    How long has patient had these symptoms:  3 weeks  ago found changes     Would the patient rather a call back or a response via [facility]?  Call back     Best Call Back Number: [phone]     Additional Information: pt is needing a call back to be advised on how to proceed.

## 2024-11-18 ENCOUNTER — HOSPITAL ENCOUNTER (OUTPATIENT)
Dept: RADIOLOGY | Facility: HOSPITAL | Age: 64
Discharge: HOME | End: 2024-11-18
Attending: NEUROLOGICAL SURGERY
Payer: COMMERCIAL

## 2024-11-18 DIAGNOSIS — I67.1 CEREBRAL ANEURYSM, NONRUPTURED: ICD-10-CM

## 2024-11-18 LAB
CREAT SERPL-MCNC: 1.4 MG/DL (ref 0.5–1.4)
SAMPLE: NORMAL

## 2024-11-18 PROCEDURE — 70496 CT ANGIOGRAPHY HEAD: CPT | Mod: 26,,, | Performed by: RADIOLOGY

## 2024-11-18 PROCEDURE — 70498 CT ANGIOGRAPHY NECK: CPT | Mod: 26,,, | Performed by: RADIOLOGY

## 2024-11-18 PROCEDURE — 25500020 PHARM REV CODE 255: Performed by: NEUROLOGICAL SURGERY

## 2024-11-18 PROCEDURE — 70546 MR ANGIOGRAPH HEAD W/O&W/DYE: CPT | Mod: 26,,, | Performed by: RADIOLOGY

## 2024-11-18 PROCEDURE — A9585 GADOBUTROL INJECTION: HCPCS | Performed by: NEUROLOGICAL SURGERY

## 2024-11-18 PROCEDURE — 70496 CT ANGIOGRAPHY HEAD: CPT | Mod: TC

## 2024-11-18 PROCEDURE — 70546 MR ANGIOGRAPH HEAD W/O&W/DYE: CPT | Mod: TC

## 2024-11-18 RX ORDER — GADOBUTROL 604.72 MG/ML
10 INJECTION INTRAVENOUS
Status: COMPLETED | OUTPATIENT
Start: 2024-11-18 | End: 2024-11-18

## 2024-11-18 RX ADMIN — GADOBUTROL 10 ML: 604.72 INJECTION INTRAVENOUS at 07:11

## 2024-11-18 RX ADMIN — IOHEXOL 100 ML: 350 INJECTION, SOLUTION INTRAVENOUS at 06:11

## 2024-11-19 ENCOUNTER — TELEPHONE (OUTPATIENT)
Dept: NEUROSURGERY | Facility: CLINIC | Age: 64
End: 2024-11-19
Payer: COMMERCIAL

## 2024-11-19 ENCOUNTER — PATIENT MESSAGE (OUTPATIENT)
Dept: NEUROSURGERY | Facility: CLINIC | Age: 64
End: 2024-11-19

## 2024-11-19 ENCOUNTER — OFFICE VISIT (OUTPATIENT)
Dept: NEUROSURGERY | Facility: CLINIC | Age: 64
End: 2024-11-19
Payer: COMMERCIAL

## 2024-11-19 DIAGNOSIS — I67.1 CEREBRAL ANEURYSM, NONRUPTURED: Primary | ICD-10-CM

## 2024-11-19 PROCEDURE — 99215 OFFICE O/P EST HI 40 MIN: CPT | Mod: 95,,, | Performed by: NEUROLOGICAL SURGERY

## 2024-11-19 PROCEDURE — 4010F ACE/ARB THERAPY RXD/TAKEN: CPT | Mod: CPTII,95,, | Performed by: NEUROLOGICAL SURGERY

## 2024-11-19 NOTE — PROGRESS NOTES
Neurosurgery  Established Patient    Scribe Attestation  I, Destini Sung, attest that this documentation has been prepared under the direction and in the presence of Kalpana Ibarra MD.    Virtual visit Attestation   The patient location is: Home  The chief complaint leading to consultation is: f/u with updated imaging   Visit type: audiovisual  Total time spent with patient: 20 min     Each patient to whom he or she provides medical services by telemedicine is:  (1) informed of the relationship between the physician and patient and the respective role of any other health care provider with respect to management of the patient; and (2) notified that he or she may decline to receive medical services by telemedicine and may withdraw from such care at any time.     SUBJECTIVE:     History of Present Illness 10/18/24:  Carrol presents for follow-up regarding a recently discovered growth in a cerebral aneurysm, with concerns about its management and potential treatment options.     Carrol was initially diagnosed with a cerebral aneurysm in 2017 or 2018 by Dr. Tj Altamirano at [facility] during a study related to her orthostatic tremor. Dr. Menezes recently informed her that the aneurysm appears to have grown, causing anxiety. Carrol has orthostatic tremor, affecting her ability to stand. She also reports occasional sinus trouble and stomach issues that Dr. Nation believes may be related to her long-term lithium use. Carrol has been taking lithium carbonate for about 40 years and is currently asymptomatic. Dr. Menezes recommended weaning off the lithium, which the patient started 1 day ago, causing concern.     Carrol reports difficulty with balance, particularly when standing on one leg. She denies any family history of aneurysms.     A podiatrist recently prescribed allopurinol for suspected gout in her foot, which she started taking about 1 week ago.     Carrol denies any family history of aneurysms, stroke, or  similar conditions. She also denies ever smoking, consuming alcohol, or using any supplements or drugs not prescribed by a doctor.     Carrol is on Irbesartan 150 mg daily for blood pressure management. She has been taking Lithium carbonate for about 40 years for an unspecified reason, though she reports being asymptomatic. Allopurinol was started about 1 week ago for suspected gout in her foot.     Carrol has a history of orthostatic tremor, hypertension, suspected gout in foot, and early onset arthritis. She also experienced diverticulitis in 2018.     Family history is significant for her father who  at 49 during heart catheterization due to a dye allergy. Her mother passed away in 2018 with multiple myeloma.     Carrol underwent bilateral knee replacement due to early onset arthritis. She had an appendectomy at age 7, a cholecystectomy in , and a bowel resection in 2018 due to diverticulitis. She also had a tonsillectomy in .     A CT Angiography (CTA) of the head on 2018, revealed a basilar apex aneurysm measuring approximately 2.7 mm wide, 3 mm tall, and 3.5 mm wide in the body. A recent MRI of the head W Contrast showed the aneurysm to be slightly wider at the neck (4 mm) and body (3.6-3.7 mm), with a height of about 4 mm. It's noted that MRI W Contrast is not optimal for assessing blood vessels compared to CTA.     Carrol is allergic to Codeine and Benadryl with unspecified reactions. She experiences blisters with Latex exposure and reports GI issues with Ibuprofen.     Carrol does not consume alcohol and has never smoked. She is currently retired.    Interval history 24:  Patient returns to clinic with updated imaging.     This was a virtual audio visual visit.    Review of patient's allergies indicates:   Allergen Reactions    Codeine Hives    Diphenhydramine Other (See Comments)     chest pains  Other reaction(s): Other (See Comments), Unknown  chest pains  chest pains     Tolmetin Other (See Comments)     stomach pains.  Other reaction(s): Unknown  stomach pains.    Latex Itching and Swelling     Can the patient chew gum without allergic reation? yes  Can the patient blow up a balloon without reaction? No  Is the patient allergic to kiwi, bananas, avocado, or chestnuts? No  Immediate itch or reaction to latex gloves yes  Known allergy to latex (i.e. blood test)? Yes  Does the patient require special undergarments, such as panties/briefs with special waist bandno  Can the patient chew gum without allergic reation? yes  Can the patient blow up a balloon without reaction? No  Is the patient allergic to kiwi, bananas, avocado, or chestnuts? No  Immediate itch or reaction to latex gloves yes  Known allergy to latex (i.e. blood test)? Yes  Does the patient require special undergarments, such as panties/briefs with special waist bandno  Can the patient chew gum without allergic reation? yes  Can the patient blow up a balloon without reaction? No  Is the patient allergic to kiwi, bananas, avocado, or chestnuts? No  Immediate itch or reaction to latex gloves yes  Known allergy to latex (i.e. blood test)? Yes  Does the patient require special undergarments, such as panties/briefs with special waist bandno    Metronidazole Other (See Comments)     Sores on inside of tongue       Current Outpatient Medications   Medication Sig Dispense Refill    albuterol (PROVENTIL/VENTOLIN HFA) 90 mcg/actuation inhaler Inhale 1-2 puffs into the lungs every 4 (four) hours as needed for Wheezing or Shortness of Breath. Rescue 8 g 0    AMLODIPINE BESYLATE, BULK, MISC Take 5 mg by mouth once daily.      benzocaine-menthoL 6-10 mg lozenge Take 1 lozenge by mouth every 2 (two) hours as needed. 36 tablet 0    cetirizine (ZYRTEC) 10 MG tablet Take 10 mg by mouth.      chlordiazepoxide (LIBRIUM) 5 MG capsule Take 1 capsule (5 mg total) by mouth 2 (two) times daily. 60 capsule 5    chlorhexidine (PERIDEX) 0.12 %  solution RINSE AND SPIT WITH 15ML FOR 30 SECONDS BID  0    hydroCHLOROthiazide (HYDRODIURIL) 12.5 MG Tab Take 12.5 mg by mouth once daily.      hydrocodone-acetaminophen 5-325mg (NORCO) 5-325 mg per tablet 325 tablets.  0    hydrocodone-acetaminophen 7.5-325mg (NORCO) 7.5-325 mg per tablet TK 1 T PO Q 4 HOURS PRF PAIN  0    irbesartan (AVAPRO) 150 MG tablet   0    lisinopril (PRINIVIL,ZESTRIL) 5 MG tablet Take 5 mg by mouth once daily.      lithium (ESKALITH) 300 MG capsule 300 mg.  5    metoprolol succinate (TOPROL-XL) 25 MG 24 hr tablet 25 mg.  3    mupirocin (BACTROBAN) 2 % ointment SMARTSI Milliliter(s) Topical 1-2 Times Daily      predniSONE (DELTASONE) 10 MG tablet Take by mouth.       No current facility-administered medications for this visit.       Past Medical History:   Diagnosis Date    Bipolar disorder     Hypertension      Past Surgical History:   Procedure Laterality Date    CHOLECYSTECTOMY      COLON SURGERY  2018    HYSTERECTOMY  1984    MOUTH SURGERY  2017    oopherectomy  2005    TONSILLECTOMY  1984    TOTAL KNEE ARTHROPLASTY       bilateral     Family History       Problem Relation (Age of Onset)    Heart disease Father, Brother    Hypertension Mother          Social History     Socioeconomic History    Marital status:    Tobacco Use    Smoking status: Never    Smokeless tobacco: Never   Substance and Sexual Activity    Alcohol use: No    Drug use: No    Sexual activity: Never     Partners: Male     Social Drivers of Health     Financial Resource Strain: Low Risk  (2019)    Received from [facility]    Overall Financial Resource Strain (CARDIA)     Difficulty of Paying Living Expenses: Not hard at all   Food Insecurity: No Food Insecurity (2019)    Received from [facility] and [facility]    Hunger Vital Sign     Worried About Running Out of Food in the Last Year: Never true     Ran Out of Food in the Last  Year: Never true   Transportation Needs: No Transportation Needs (8/13/2019)    Received from [facility] and [facility]    PRAPARE - Transportation     Lack of Transportation (Medical): No     Lack of Transportation (Non-Medical): No       Review of Systems   All other systems reviewed and are negative.      OBJECTIVE:     Vital Signs     There is no height or weight on file to calculate BMI.    Neurosurgery Physical Exam  On virtual audio visual visit   Head is normocephalic atraumatic   Neck is grossly supple  No appreciable labored breathing   Admitting appears to be nontender   Patient is able to move extremities     On virtual audio visual visit the patient's speech is fluent, goal directed without any noted dysarthria or aphasia   Unable to evaluate pupils on virtual audio visual visit   Face is symmetric   Tongue is midline  Unable to evaluate palate   Hearing appears to be intact on virtual audio visual visit to voice   Patient able to move both upper and lower extremities without any gross motor asymmetry on virtual audio visual visit     Diagnostic Results:  I personally reviewed the patient's Diagnostic Imaging.     CTA Head and Neck / MRA Brain 11/18/24  No evidence of acute infarct or hemorrhage.     No small basilar tip aneurysm appears very similar in size and configuration to the prior.  No abnormal vessel wall enhancement.     No new intracranial aneurysm or other significant vascular abnormality identified elsewhere.    ASSESSMENT/PLAN:   62 y/o F with  concern for residual aneurysm     Patient presents with no clear focality on virtual audio visual visit     Discussed imaging results of CTA Head and Neck / MRA Brain revealing possible residual aneurysm.     After discussion with the patient, I recommend cerebral angiogram . I have answered all of their questions and patient wish to proceed with this plan. We will schedule patient.      Thank you so very much for allowing me  to participate in the care of this patient.  Please feel free to call any questions, comments, or concerns.     Kalpana Ibarra MD,MSc  Department of Neurosurgery   Department of Radiology  Department of Neurology  [facility]    [facility]   [employer] [facility] [employer] [facility]     Total time spent in counseling and discussion about further management options including relevant lab work, treatment,  prognosis, medications and intended side effects was more than 60 minutes. More than 50 % of the time was spent in counseling and coordination of care.  I spent a total of 60 minutes on the day of the visit.This includes face to face time and non-face to face time preparing to see the patient (eg, review of tests), Obtaining and/or reviewing separately obtained history, Documenting clinical information in the electronic or other health record, Independently interpreting resultsand communicating results to the patient/family/caregiver, or Care coordination.     Scribe Attestation  I, Dr. Kalpana Ibarra personally performed the services described in this documentation. All medical record entries made by the scribe, Destini Sung, were at my direction and in my presence.  I have reviewed the chart and agree that the record reflects my personal performance and is accurate and complete.

## 2024-11-23 NOTE — PROGRESS NOTES
Neurosurgery  History & Physical    SUBJECTIVE:     Chief Complaint: Cerebral aneurysm     History of Present Illness:      History of Present Illness    Carrol presents for follow-up regarding a previously treated intracranial aneurysm, with recent imaging showing possible residual aneurysm.    Carrol recently underwent imaging tests at [facility]. Dr. Márquez reviewed the scans and noted a possible residual abnormality, potentially related to a previously treated intracranial aneurysm. Carrol expresses anxiety about the situation and requests prompt scheduling of the angiogram.    CTA results reveal a superiorly projected outpouching from the basilar apex, consistent with an aneurysm. 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 The configuration appears similar to previous images, with no obvious wall enhancement on vessel wall imaging, though there may be some areas of slight enhancement.    Carrol denies any current symptoms related to the aneurysm.    Carrol may be on Aspirin as a potential antiplatelet medication, but current use is unconfirmed.    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    She underwent an angiogram in 2004, during which an angio device was placed in her groin.    Carrol recently underwent a CT Angiogram (CTA) of the Head and Neck. The findings revealed that the basis of the artery appears relatively normal. A superiorly projected out pouching from the basilar apex was observed, which appears to be an aneurysm. The aneurysm configuration is similar to previous images. No obvious wall enhancement was noted on vessel wall imaging, although there might be some areas of enhancement, but not thick. The aneurysm appears somewhat smooth and measures approximately 3.7 mm in height and 3.3 mm in width. The CTA confirmed the presence of a wide-necked basilar apex aneurysm. Some distortion was noted on the CTA  images.      ROS:  Psychiatric: +anxiety         Review of patient's allergies indicates:   Allergen Reactions    Codeine Hives    Diphenhydramine Other (See Comments)     chest pains  Other reaction(s): Other (See Comments), Unknown  chest pains  chest pains    Tolmetin Other (See Comments)     stomach pains.  Other reaction(s): Unknown  stomach pains.    Latex Itching and Swelling     Can the patient chew gum without allergic reation? yes  Can the patient blow up a balloon without reaction? No  Is the patient allergic to kiwi, bananas, avocado, or chestnuts? No  Immediate itch or reaction to latex gloves yes  Known allergy to latex (i.e. blood test)? Yes  Does the patient require special undergarments, such as panties/briefs with special waist bandno  Can the patient chew gum without allergic reation? yes  Can the patient blow up a balloon without reaction? No  Is the patient allergic to kiwi, bananas, avocado, or chestnuts? No  Immediate itch or reaction to latex gloves yes  Known allergy to latex (i.e. blood test)? Yes  Does the patient require special undergarments, such as panties/briefs with special waist bandno  Can the patient chew gum without allergic reation? yes  Can the patient blow up a balloon without reaction? No  Is the patient allergic to kiwi, bananas, avocado, or chestnuts? No  Immediate itch or reaction to latex gloves yes  Known allergy to latex (i.e. blood test)? Yes  Does the patient require special undergarments, such as panties/briefs with special waist bandno    Metronidazole Other (See Comments)     Sores on inside of tongue       Current Outpatient Medications   Medication Sig Dispense Refill    albuterol (PROVENTIL/VENTOLIN HFA) 90 mcg/actuation inhaler Inhale 1-2 puffs into the lungs every 4 (four) hours as needed for Wheezing or Shortness of Breath. Rescue 8 g 0    AMLODIPINE BESYLATE, BULK, MISC Take 5 mg by mouth once daily.      benzocaine-menthoL 6-10 mg lozenge Take 1 lozenge by  mouth every 2 (two) hours as needed. 36 tablet 0    cetirizine (ZYRTEC) 10 MG tablet Take 10 mg by mouth.      chlordiazepoxide (LIBRIUM) 5 MG capsule Take 1 capsule (5 mg total) by mouth 2 (two) times daily. 60 capsule 5    chlorhexidine (PERIDEX) 0.12 % solution RINSE AND SPIT WITH 15ML FOR 30 SECONDS BID  0    hydroCHLOROthiazide (HYDRODIURIL) 12.5 MG Tab Take 12.5 mg by mouth once daily.      hydrocodone-acetaminophen 5-325mg (NORCO) 5-325 mg per tablet 325 tablets.  0    hydrocodone-acetaminophen 7.5-325mg (NORCO) 7.5-325 mg per tablet TK 1 T PO Q 4 HOURS PRF PAIN  0    irbesartan (AVAPRO) 150 MG tablet   0    lisinopril (PRINIVIL,ZESTRIL) 5 MG tablet Take 5 mg by mouth once daily.      lithium (ESKALITH) 300 MG capsule 300 mg.  5    metoprolol succinate (TOPROL-XL) 25 MG 24 hr tablet 25 mg.  3    mupirocin (BACTROBAN) 2 % ointment SMARTSI Milliliter(s) Topical 1-2 Times Daily      predniSONE (DELTASONE) 10 MG tablet Take by mouth.       No current facility-administered medications for this visit.       Past Medical History:   Diagnosis Date    Bipolar disorder     Hypertension      Past Surgical History:   Procedure Laterality Date    CHOLECYSTECTOMY      COLON SURGERY  2018    HYSTERECTOMY  1984    MOUTH SURGERY  2017    oopherectomy  2005    TONSILLECTOMY  1984    TOTAL KNEE ARTHROPLASTY       bilateral     Family History       Problem Relation (Age of Onset)    Heart disease Father, Brother    Hypertension Mother          Social History     Socioeconomic History    Marital status:    Tobacco Use    Smoking status: Never    Smokeless tobacco: Never   Substance and Sexual Activity    Alcohol use: No    Drug use: No    Sexual activity: Never     Partners: Male     Social Drivers of Health     Financial Resource Strain: Low Risk  (2019)    Received [facility]    Overall Financial Resource Strain (CARDIA)     Difficulty of Paying Living  Expenses: Not hard at all   Food Insecurity: No Food Insecurity (8/13/2019)    Received from [facility]    Hunger Vital Sign     Worried About Running Out of Food in the Last Year: Never true     Ran Out of Food in the Last Year: Never true   Transportation Needs: No Transportation Needs (8/13/2019)    Received from [facility] and [facility]    PRAPARE - Transportation     Lack of Transportation (Medical): No     Lack of Transportation (Non-Medical): No       Review of Systems    OBJECTIVE:     Vital Signs     There is no height or weight on file to calculate BMI.      Neurosurgery Physical Exam    Physical Exam    General: No acute distress.  Head: Nontraumatic. Normocephalic.  Eyes: Pupils equal. EOMI.  Neck: Supple. Normal ROM. No tenderness to palpation.  CVS: Normal rate and rhythm. Distal pulses present.  Pulm: Symmetric expansion. No respiratory distress.  GI: Abdomen nondistended. Nontender.  MSK: Moves all extremities without restriction. Atraumatic.  Skin: Dry. Intact.  Psych: Normal thought content. Normal cognition.  Neuro: Awake. Alert and oriented to self, place, and time.  Language: Speech is fluent and goal-directed without dysarthria or aphasia.           Diagnostic Results:  I personally reviewed the patients diagnostic imaging    ASSESSMENT/PLAN:     Assessment & Plan    Reviewed patient's scans, noting possible residual findings  Recommend angiogram to further evaluate changes and determine appropriate treatment options  Considered potential treatment approaches including open surgery, stenting, or coiling, but deferred decision until after angiogram results  Planned to perform angiogram via wrist access  Reviewed CTA of head and neck, noting:   Normal-appearing basis of artery   Superiorly projected outpouching at basilar apex, consistent with aneurysm   No obvious wall enhancement on vessel wall imaging   Aneurysm measurements: ~3.7 mm  height, ~3.3 mm width   Characteristics suggestive of wide-necked basilar apex aneurysm    CEREBRAL ANEURYSM:  - Explained purpose of angiogram in evaluating previously treated area and determining potential next steps.  - Discussed various treatment options (open surgery, stenting, coiling) and their implications.  - Provided information on angiogram procedure, including access site (wrist) and post-procedure care.  - Treat right arm/hand like a broken wrist for 24 hours post-angiogram.  - Do not lift anything heavier than a gallon of milk for 24 hours after the procedure.  - Angiogram procedure scheduled.    FOLLOW-UP:  - Follow up after Thanksgiving to schedule angiogram procedure.  - Follow up after angiogram to discuss results and determine next steps in treatment.  - Contact the office with any questions before the procedure.         Kalpana Ibarra MD,MSc  Department of Neurosurgery     Note dictated with voice recognition software, please excuse any grammatical errors.  This note was generated with the assistance of ambient listening technology. Verbal consent was obtained by the patient and accompanying visitor(s) for the recording of patient appointment to facilitate this note. I attest to having reviewed and edited the generated note for accuracy, though some syntax or spelling errors may persist. Please contact the author of this note for any clarification.

## 2024-12-20 ENCOUNTER — TELEPHONE (OUTPATIENT)
Dept: NEUROSURGERY | Facility: CLINIC | Age: 64
End: 2024-12-20
Payer: COMMERCIAL

## 2024-12-20 NOTE — TELEPHONE ENCOUNTER
----- Message from Sundeep sent at 12/20/2024  2:22 PM CST -----  Regarding: Callback  Contact: [phone] or [phone]  Patient calling requesting a callback from nurse Howell in regards to reporting issues she's having. Please call back as soon as possible.

## 2024-12-20 NOTE — TELEPHONE ENCOUNTER
Last two weeks more headaches, having increase problems with waking, standing, needs to hold on or lean on something.explained will speak with gray lee on Monday. Pt yana.

## 2024-12-23 ENCOUNTER — PATIENT MESSAGE (OUTPATIENT)
Dept: NEUROSURGERY | Facility: CLINIC | Age: 64
End: 2024-12-23
Payer: COMMERCIAL

## 2025-01-09 ENCOUNTER — TELEPHONE (OUTPATIENT)
Dept: NEUROSURGERY | Facility: CLINIC | Age: 65
End: 2025-01-09
Payer: COMMERCIAL

## 2025-01-09 NOTE — TELEPHONE ENCOUNTER
----- Message from Ania sent at 1/9/2025 12:12 PM CST -----  Regarding: Advise  Contact: [phone]  Carrol Molina calling regarding Patient Advice (message) for # pt is calling to speak with Lynda regarding an angiogram pls advise

## 2025-01-09 NOTE — TELEPHONE ENCOUNTER
Returned call to pt. Pt stated her neurologist in [place] is wondering if dr. Ibarra could assist in bumping the procedure up. She stated he sent dr. Ibarra a text while she was there. I explained dr. Ibarra is in surgery today and tomorrow. If I do not hear from him I will ask on Monday during clinic. I asked if something had changed and pt stated no just concerned about the wait.

## 2025-01-10 ENCOUNTER — PATIENT MESSAGE (OUTPATIENT)
Dept: NEUROSURGERY | Facility: CLINIC | Age: 65
End: 2025-01-10
Payer: MEDICARE

## 2025-01-13 ENCOUNTER — TELEPHONE (OUTPATIENT)
Dept: NEUROSURGERY | Facility: CLINIC | Age: 65
End: 2025-01-13
Payer: MEDICARE

## 2025-01-13 NOTE — TELEPHONE ENCOUNTER
"----- Message from Marina sent at 1/13/2025  2:27 PM CST -----  Regarding: pt advice  Contact: [phone]  .Name Of Caller: Self     Contact Preference?[phone]:     What is the nature of the call?: in reference to needing a call back from nurse in reference to procedures not being covered, pls call      Additional Notes:  "Thank you for all that you do for our patients"  "

## 2025-01-13 NOTE — TELEPHONE ENCOUNTER
Returned call to pt. Pt stated sge was told Dr. Ibarra is no longer on her ins and they are asking for $10,000. Explained that in this office we have no responsibility of ins. Explained we are part of the [facility] facility and do not have a say so in the ins. Encouraged pt to call ins and find another neurosurgery who is on her ins and also to call the financial aid dept at [facility] to see if there are any programs. Jose Antonio millan.

## 2025-01-17 ENCOUNTER — PATIENT MESSAGE (OUTPATIENT)
Dept: INTERVENTIONAL RADIOLOGY/VASCULAR | Facility: HOSPITAL | Age: 65
End: 2025-01-17
Payer: MEDICARE

## 2025-01-20 ENCOUNTER — PATIENT MESSAGE (OUTPATIENT)
Dept: NEUROSURGERY | Facility: CLINIC | Age: 65
End: 2025-01-20
Payer: MEDICARE

## 2025-01-21 ENCOUNTER — TELEPHONE (OUTPATIENT)
Dept: NEUROSURGERY | Facility: CLINIC | Age: 65
End: 2025-01-21
Payer: MEDICARE

## 2025-01-21 NOTE — TELEPHONE ENCOUNTER
Received teams message kristel was returning call. Spoke with kristel. She appr angiogram. She will fax appr to [phone]

## 2025-01-21 NOTE — TELEPHONE ENCOUNTER
----- Message from Carmelita sent at 1/21/2025  9:36 AM CST -----  Regarding: Yamini from [employer] called would like to speak with someone regarding the Pt's Angio thats schduled for 01/23  Contact: [phone]  Name of Who is Calling:Yamini ([employer])        What is the request in detail:Yamini from [employer] called would like to speak with someone regarding the Pt's Angio thats schduled for 01/23. No details as to why. Please advise         Can the clinic reply by [facility]:No        What Number to [facility] Back [facility]: [phone]

## 2025-02-05 ENCOUNTER — PATIENT MESSAGE (OUTPATIENT)
Dept: INTERVENTIONAL RADIOLOGY/VASCULAR | Facility: HOSPITAL | Age: 65
End: 2025-02-05
Payer: MEDICARE

## 2025-02-10 ENCOUNTER — PATIENT MESSAGE (OUTPATIENT)
Dept: NEUROSURGERY | Facility: CLINIC | Age: 65
End: 2025-02-10
Payer: MEDICARE

## 2025-02-10 ENCOUNTER — TELEPHONE (OUTPATIENT)
Dept: INTERVENTIONAL RADIOLOGY/VASCULAR | Facility: HOSPITAL | Age: 65
End: 2025-02-10
Payer: MEDICARE

## 2025-02-10 NOTE — NURSING
Pre-procedure call complete.  2 patient identifier used (name and ).  Pt instructed not to eat  anything after midnight the night before procedure.  Pt aware will need someone to provide transport home and monitor pt 8 hours post procedure.  No driving for 3 days after procedure.   Patient advised to take blood pressure, heart medications,  with a sip of water morning of procedure.  Patient verbalized aware of which medications to take.  Do not take  sleep medication (including OTC) and anxiety medication the night before procedure.  Arrival time and location given.  Expected length of stay reviewed.  Covid screening completed.  Pt verbalized understanding of all pre-procedure instructions.  Written instructions and directions sent to patient in [employer]/portal.

## 2025-02-13 ENCOUNTER — HOSPITAL ENCOUNTER (OUTPATIENT)
Dept: INTERVENTIONAL RADIOLOGY/VASCULAR | Facility: HOSPITAL | Age: 65
Discharge: HOME | End: 2025-02-13
Attending: NEUROLOGICAL SURGERY
Payer: MEDICARE

## 2025-02-13 VITALS
HEART RATE: 64 BPM | SYSTOLIC BLOOD PRESSURE: 116 MMHG | TEMPERATURE: 98 F | HEIGHT: 64 IN | WEIGHT: 190 LBS | DIASTOLIC BLOOD PRESSURE: 60 MMHG | RESPIRATION RATE: 17 BRPM | OXYGEN SATURATION: 98 % | BODY MASS INDEX: 32.44 KG/M2

## 2025-02-13 DIAGNOSIS — I67.1 CEREBRAL ANEURYSM, NONRUPTURED: ICD-10-CM

## 2025-02-13 LAB
ANION GAP SERPL CALC-SCNC: 6 MMOL/L (ref 8–16)
BUN SERPL-MCNC: 17 MG/DL (ref 8–23)
CALCIUM SERPL-MCNC: 10.2 MG/DL (ref 8.7–10.5)
CHLORIDE SERPL-SCNC: 111 MMOL/L (ref 95–110)
CO2 SERPL-SCNC: 23 MMOL/L (ref 23–29)
CREAT SERPL-MCNC: 1 MG/DL (ref 0.5–1.4)
EST. GFR  (NO RACE VARIABLE): >60 ML/MIN/1.73 M^2
GLUCOSE SERPL-MCNC: 92 MG/DL (ref 70–110)
POTASSIUM SERPL-SCNC: 4.2 MMOL/L (ref 3.5–5.1)
SODIUM SERPL-SCNC: 140 MMOL/L (ref 136–145)

## 2025-02-13 PROCEDURE — 25500020 PHARM REV CODE 255: Performed by: NEUROLOGICAL SURGERY

## 2025-02-13 PROCEDURE — C1894 INTRO/SHEATH, NON-LASER: HCPCS

## 2025-02-13 PROCEDURE — 63600175 PHARM REV CODE 636 W HCPCS: Performed by: STUDENT IN AN ORGANIZED HEALTH CARE EDUCATION/TRAINING PROGRAM

## 2025-02-13 PROCEDURE — 25000003 PHARM REV CODE 250: Performed by: STUDENT IN AN ORGANIZED HEALTH CARE EDUCATION/TRAINING PROGRAM

## 2025-02-13 PROCEDURE — 80048 BASIC METABOLIC PNL TOTAL CA: CPT | Performed by: FAMILY MEDICINE

## 2025-02-13 PROCEDURE — C1769 GUIDE WIRE: HCPCS

## 2025-02-13 PROCEDURE — C1887 CATHETER, GUIDING: HCPCS

## 2025-02-13 RX ORDER — LIDOCAINE HYDROCHLORIDE 10 MG/ML
INJECTION, SOLUTION INFILTRATION; PERINEURAL
Status: COMPLETED | OUTPATIENT
Start: 2025-02-13 | End: 2025-02-13

## 2025-02-13 RX ORDER — LIDOCAINE HYDROCHLORIDE 10 MG/ML
1 INJECTION, SOLUTION EPIDURAL; INFILTRATION; INTRACAUDAL; PERINEURAL ONCE
Status: DISCONTINUED | OUTPATIENT
Start: 2025-02-13 | End: 2025-02-14 | Stop reason: HOSPADM

## 2025-02-13 RX ORDER — IODIXANOL 320 MG/ML
200 INJECTION, SOLUTION INTRAVASCULAR
Status: COMPLETED | OUTPATIENT
Start: 2025-02-13 | End: 2025-02-13

## 2025-02-13 RX ORDER — SODIUM CHLORIDE 0.9 % (FLUSH) 0.9 %
10 SYRINGE (ML) INJECTION
Status: DISCONTINUED | OUTPATIENT
Start: 2025-02-13 | End: 2025-02-14 | Stop reason: HOSPADM

## 2025-02-13 RX ORDER — HEPARIN SODIUM 1000 [USP'U]/ML
INJECTION, SOLUTION INTRAVENOUS; SUBCUTANEOUS
Status: COMPLETED | OUTPATIENT
Start: 2025-02-13 | End: 2025-02-13

## 2025-02-13 RX ORDER — SODIUM CHLORIDE 9 MG/ML
INJECTION, SOLUTION INTRAVENOUS CONTINUOUS
Status: DISCONTINUED | OUTPATIENT
Start: 2025-02-13 | End: 2025-02-14 | Stop reason: HOSPADM

## 2025-02-13 RX ORDER — FENTANYL CITRATE 50 UG/ML
INJECTION, SOLUTION INTRAMUSCULAR; INTRAVENOUS
Status: COMPLETED | OUTPATIENT
Start: 2025-02-13 | End: 2025-02-13

## 2025-02-13 RX ORDER — ONDANSETRON HYDROCHLORIDE 2 MG/ML
4 INJECTION, SOLUTION INTRAVENOUS EVERY 6 HOURS PRN
Status: DISCONTINUED | OUTPATIENT
Start: 2025-02-13 | End: 2025-02-14 | Stop reason: HOSPADM

## 2025-02-13 RX ORDER — MIDAZOLAM HYDROCHLORIDE 1 MG/ML
INJECTION, SOLUTION INTRAMUSCULAR; INTRAVENOUS
Status: COMPLETED | OUTPATIENT
Start: 2025-02-13 | End: 2025-02-13

## 2025-02-13 RX ADMIN — FENTANYL CITRATE 25 MCG: 50 INJECTION, SOLUTION INTRAMUSCULAR; INTRAVENOUS at 01:02

## 2025-02-13 RX ADMIN — HEPARIN SODIUM 2000 UNITS: 1000 INJECTION, SOLUTION INTRAVENOUS; SUBCUTANEOUS at 01:02

## 2025-02-13 RX ADMIN — MIDAZOLAM HYDROCHLORIDE 1 MG: 2 INJECTION, SOLUTION INTRAMUSCULAR; INTRAVENOUS at 01:02

## 2025-02-13 RX ADMIN — MIDAZOLAM HYDROCHLORIDE 0.5 MG: 2 INJECTION, SOLUTION INTRAMUSCULAR; INTRAVENOUS at 01:02

## 2025-02-13 RX ADMIN — HEPARIN SODIUM 3000 ML: 1000 INJECTION, SOLUTION INTRAVENOUS; SUBCUTANEOUS at 01:02

## 2025-02-13 RX ADMIN — FENTANYL CITRATE 50 MCG: 50 INJECTION, SOLUTION INTRAMUSCULAR; INTRAVENOUS at 01:02

## 2025-02-13 RX ADMIN — LIDOCAINE HYDROCHLORIDE 5 ML: 10 INJECTION, SOLUTION INFILTRATION; PERINEURAL at 01:02

## 2025-02-13 RX ADMIN — IODIXANOL 120 ML: 320 INJECTION, SOLUTION INTRAVASCULAR at 01:02

## 2025-02-13 NOTE — H&P
Interventional Neuroradiology Pre-procedure Note    History of Present Illness:  Carrol Molina is a 64 y.o. 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     Past Medical History:   Diagnosis Date    Bipolar disorder     Hypertension      Past Surgical History:   Procedure Laterality Date    CHOLECYSTECTOMY  2008    COLON SURGERY  08/17/2018    HYSTERECTOMY  1984    MOUTH SURGERY  07/2017    oopherectomy  2005    TONSILLECTOMY  1984    TOTAL KNEE ARTHROPLASTY       bilateral       Review of Systems:   As documented in primary team H&P    Home Meds:   Prior to Admission medications    Medication Sig Start Date End Date Taking? Authorizing Provider   albuterol (PROVENTIL/VENTOLIN HFA) 90 mcg/actuation inhaler Inhale 1-2 puffs into the lungs every 4 (four) hours as needed for Wheezing or Shortness of Breath. Rescue 2/8/23 2/8/24  Fernie Gonsales PA-C   AMLODIPINE BESYLATE, BULK, MISC Take 5 mg by mouth once daily.    Provider, Historical   benzocaine-menthoL 6-10 mg lozenge Take 1 lozenge by mouth every 2 (two) hours as needed. 2/8/23   Fernie Gonsales, BOLIVAR   busPIRone (BUSPAR) 10 MG tablet Take 10 mg by mouth 2 (two) times daily as needed. 12/20/24  Yes Provider, Historical   cetirizine (ZYRTEC) 10 MG tablet Take 10 mg by mouth nightly as needed.   Yes Provider, Historical   chlordiazepoxide (LIBRIUM) 5 MG capsule Take 1 capsule (5 mg total) by mouth 2 (two) times daily. 9/5/18 10/5/18  Riya Wilson MD   chlorhexidine (PERIDEX) 0.12 % solution RINSE AND SPIT WITH 15ML FOR 30 SECONDS BID 7/20/17   Provider, Historical   hydroCHLOROthiazide (HYDRODIURIL) 12.5 MG Tab Take 12.5 mg by mouth once daily. 8/8/22   Provider, Historical   hydrocodone-acetaminophen 5-325mg (NORCO) 5-325 mg per tablet 325 tablets. 8/17/17   Provider, Historical   hydrocodone-acetaminophen 7.5-325mg (NORCO) 7.5-325 mg per tablet TK 1 T PO Q 4 HOURS PRF PAIN 7/20/17   Provider, Historical   irbesartan (AVAPRO) 150 MG  tablet Take 150 mg by mouth once daily. 18  Yes Provider, Historical   lisinopril (PRINIVIL,ZESTRIL) 5 MG tablet Take 5 mg by mouth once daily.    Provider, Historical   lithium (ESKALITH) 300 MG capsule 300 mg. 17   Provider, Historical   metoprolol succinate (TOPROL-XL) 25 MG 24 hr tablet 25 mg. 17   Provider, Historical   mupirocin (BACTROBAN) 2 % ointment SMARTSI Milliliter(s) Topical 1-2 Times Daily 3/25/22   Provider, Historical   predniSONE (DELTASONE) 10 MG tablet Take by mouth. 5/10/22   Provider, Historical     Scheduled Meds:    LIDOcaine (PF) 10 mg/ml (1%)  1 mL Other Once     Continuous Infusions:    0.9% NaCl   Intravenous Continuous         PRN Meds:  Anticoagulants/Antiplatelets: no anticoagulation    Allergies:   Review of patient's allergies indicates:   Allergen Reactions    Codeine Hives    Diphenhydramine Other (See Comments)     chest pains  Other reaction(s): Other (See Comments), Unknown  chest pains  chest pains    Tolmetin Other (See Comments)     stomach pains.  Other reaction(s): Unknown  stomach pains.    Latex Itching and Swelling     Can the patient chew gum without allergic reation? yes  Can the patient blow up a balloon without reaction? No  Is the patient allergic to kiwi, bananas, avocado, or chestnuts? No  Immediate itch or reaction to latex gloves yes  Known allergy to latex (i.e. blood test)? Yes  Does the patient require special undergarments, such as panties/briefs with special waist bandno  Can the patient chew gum without allergic reation? yes  Can the patient blow up a balloon without reaction? No  Is the patient allergic to kiwi, bananas, avocado, or chestnuts? No  Immediate itch or reaction to latex gloves yes  Known allergy to latex (i.e. blood test)? Yes  Does the patient require special undergarments, such as panties/briefs with special waist bandno  Can the patient chew gum without allergic reation? yes  Can the patient blow up a balloon without  "reaction? No  Is the patient allergic to kiwi, bananas, avocado, or chestnuts? No  Immediate itch or reaction to latex gloves yes  Known allergy to latex (i.e. blood test)? Yes  Does the patient require special undergarments, such as panties/briefs with special waist bandno    Metronidazole Other (See Comments)     Sores on inside of tongue     Sedation Hx: have not been any systemic reactions    Labs:  No results for input(s): "INR", "PT", "PTT" in the last 168 hours.  No results for input(s): "WBC", "HGB", "HCT", "MCV", "PLT" in the last 168 hours. No results for input(s): "GLU", "NA", "K", "CL", "CO2", "BUN", "CREATININE", "CALCIUM", "MG", "ALT", "AST", "ALBUMIN", "BILITOT", "BILIDIR" in the last 168 hours.      Vitals:        Physical Exam:  ASA: III  Mallampati: II    General: no acute distress  Mental Status: alert and oriented to person, place and time  HEENT: normocephalic, atraumatic  Chest: unlabored breathing  Heart: regular heart rate  Abdomen: nondistended  Extremity: moves all extremities      Plan:  Sedation Plan: Up to moderate anesthesia  Patient will undergo: Patient will undergo cerebral angiogram     Informed consent obtained from patient. Risk vs benefits explained. Patient agrees to procedure.      Nicole Garcia MD  Fellow, NeuroEndovascular Surgery, [facility]      "

## 2025-02-13 NOTE — PLAN OF CARE
Patient admitted to SSCU. Initial assessment completed by this RN. VS obtained. 20 g IV inserted to L a/c. Plan of care reviewed with patient. Patient verbalizes understanding and agrees with plan of care. DAMIR

## 2025-02-13 NOTE — PLAN OF CARE
Pt arrived to IR room 4 for cerebral angiogram. Pt oriented to unit and staff. Plan of care reviewed with patient, patient verbalizes understanding. Comfort measures utilized. Pt safely transferred from stretcher to procedural table. Fall risk reviewed with patient, fall risk interventions maintained. Safety strap applied, positioner pillows utilized to minimize pressure points. Blankets applied. Pt prepped and draped utilizing standard sterile technique. Patient placed on continuous monitoring, as required by sedation policy. Timeouts completed utilizing standard universal time-out, per department and facility policy. RN to remain at bedside, continuous monitoring maintained. Pt resting comfortably. Denies pain/discomfort. Will continue to monitor. See flow sheets for monitoring, medication administration, and updates.

## 2025-02-13 NOTE — PROCEDURES
Interventional Neuroradiology Post-Procedure Note    Pre Op Diagnosis: Basilar tip aneurysm    Post Op Diagnosis: Same    Procedure: Diagnostic cerebral angiogram    Procedure performed by: Kalpana Ibarra MD; Jose FREEMAN, Ursula Garcia MD, Nicole    Written Informed Consent Obtained: Yes    Specimen Removed: NO    Estimated Blood Loss: Minimal    Procedure report:   A 5F sheath was placed into the right femoral artery and a 5F Terumo SIM-2 catheter was advanced into the aortic arch.  Bilateral CCA/ICA and right vertebral arteries were subselected and angiography of the brain was performed after injection into each of these vessels.    Preliminary interpretation:  Basilar tip aneurysm measuring 3.5 mm in height, 4.08 mm in width and 4.12 mm neck diameter.  Please see Imaging report for full details.    A right femoral artery angiogram was performed, the sheath removed and hemostasis achieved using 5F Mynx device.  No hematoma was present at the time of hemostasis.    The patient tolerated the procedure well.     Plan:  -To recovery for monitoring  -Bed rest for 2h  -Groin check and pulse check q2h   -Avoid carrying heavy weights > 10 lbs x 24 hrs   -Remove groin dressing tomorrow       Nicole Garcia MD  Fellow, NeuroEndovascular Surgery, [facility] [place] [place] [facility]

## 2025-02-13 NOTE — PLAN OF CARE
Cerebral angiogram procedure completed. Patient tolerated well. Patient AAOx3, no distress noted, respirations even and unlabored, will continue to monitor. VSS. vascade closure device deployed in right femoral artery; hemostasis achieved at 1341. Patient to lay flat for 2 hours until 1541 time on 2/13/25 date per MD. Right groin site clean, dry, and intact; no bleeding or hematoma noted. Patient to be transferred to mpu for post-procedural recovery per MD. Report to be given at bedside to RN.

## 2025-02-13 NOTE — NURSING
Pt and spouse given discharge instructions with all questions addressed  Pt advised to call the hosptial with any other questions

## 2025-02-14 ENCOUNTER — TELEPHONE (OUTPATIENT)
Dept: NEUROSURGERY | Facility: CLINIC | Age: 65
End: 2025-02-14
Payer: MEDICARE

## 2025-02-24 ENCOUNTER — PATIENT MESSAGE (OUTPATIENT)
Dept: NEUROSURGERY | Facility: CLINIC | Age: 65
End: 2025-02-24
Payer: MEDICARE

## 2025-03-17 ENCOUNTER — PATIENT MESSAGE (OUTPATIENT)
Dept: NEUROSURGERY | Facility: CLINIC | Age: 65
End: 2025-03-17

## 2025-03-17 ENCOUNTER — OFFICE VISIT (OUTPATIENT)
Dept: NEUROSURGERY | Facility: CLINIC | Age: 65
End: 2025-03-17
Payer: MEDICARE

## 2025-03-17 DIAGNOSIS — I67.1 CEREBRAL ANEURYSM, NONRUPTURED: Primary | ICD-10-CM

## 2025-03-17 NOTE — PROGRESS NOTES
Neurosurgery  Established Patient    Scribe Attestation  I, Destini Sung, attest that this documentation has been prepared under the direction and in the presence of Kalpana Ibarra MD.    Virtual visit Attestation   The patient location is: Home  The chief complaint leading to consultation is: s/p angiogram for basilar tip aneurysm   Visit type: audiovisual  Total time spent with patient: 20 min     Each patient to whom he or she provides medical services by telemedicine is:  (1) informed of the relationship between the physician and patient and the respective role of any other health care provider with respect to management of the patient; and (2) notified that he or she may decline to receive medical services by telemedicine and may withdraw from such care at any time.     SUBJECTIVE:     History of Present Illness 10/18/24:  Carrol presents for follow-up regarding a recently discovered growth in a cerebral aneurysm, with concerns about its management and potential treatment options.     Carrol was initially diagnosed with a cerebral aneurysm in 2017 or 2018 by Dr. Tj Altamirano at [facility] during a study related to her orthostatic tremor. Dr. Menezes recently informed her that the aneurysm appears to have grown, causing anxiety. Carrol has orthostatic tremor, affecting her ability to stand. She also reports occasional sinus trouble and stomach issues that Dr. Nation believes may be related to her long-term lithium use. Carrol has been taking lithium carbonate for about 40 years and is currently asymptomatic. Dr. Menezes recommended weaning off the lithium, which the patient started 1 day ago, causing concern.     Carrol reports difficulty with balance, particularly when standing on one leg. She denies any family history of aneurysms.     A podiatrist recently prescribed allopurinol for suspected gout in her foot, which she started taking about 1 week ago.     Carrol denies any family history of aneurysms,  stroke, or similar conditions. She also denies ever smoking, consuming alcohol, or using any supplements or drugs not prescribed by a doctor.     Carrol is on Irbesartan 150 mg daily for blood pressure management. She has been taking Lithium carbonate for about 40 years for an unspecified reason, though she reports being asymptomatic. Allopurinol was started about 1 week ago for suspected gout in her foot.     Carrol has a history of orthostatic tremor, hypertension, suspected gout in foot, and early onset arthritis. She also experienced diverticulitis in 2018.     Family history is significant for her father who  at 49 during heart catheterization due to a dye allergy. Her mother passed away in 2018 with multiple myeloma.     Carrol underwent bilateral knee replacement due to early onset arthritis. She had an appendectomy at age 7, a cholecystectomy in , and a bowel resection in 2018 due to diverticulitis. She also had a tonsillectomy in .     A CT Angiography (CTA) of the head on 2018, revealed a basilar apex aneurysm measuring approximately 2.7 mm wide, 3 mm tall, and 3.5 mm wide in the body. A recent MRI of the head W Contrast showed the aneurysm to be slightly wider at the neck (4 mm) and body (3.6-3.7 mm), with a height of about 4 mm. It's noted that MRI W Contrast is not optimal for assessing blood vessels compared to CTA.     Carrol is allergic to Codeine and Benadryl with unspecified reactions. She experiences blisters with Latex exposure and reports GI issues with Ibuprofen.     Carrol does not consume alcohol and has never smoked. She is currently retired.     Interval history 24:  Patient returns to clinic with updated imaging.      This was a virtual audio visual visit.     Interval Hx 25:  Patient returns to clinic virtually with updated imaging to evaluate basilar tip aneurysm. Today she reports feeling fine. Had some soreness around the groin area following  angiogram, but otherwise no acute issues or changes to report. Patient does get occasional headaches but states they are tolerable.          Review of patient's allergies indicates:   Allergen Reactions    Codeine Hives    Diphenhydramine Other (See Comments)     chest pains  Other reaction(s): Other (See Comments), Unknown  chest pains  chest pains    Tolmetin Other (See Comments)     stomach pains.  Other reaction(s): Unknown  stomach pains.    Latex Itching and Swelling     Can the patient chew gum without allergic reation? yes  Can the patient blow up a balloon without reaction? No  Is the patient allergic to kiwi, bananas, avocado, or chestnuts? No  Immediate itch or reaction to latex gloves yes  Known allergy to latex (i.e. blood test)? Yes  Does the patient require special undergarments, such as panties/briefs with special waist bandno  Can the patient chew gum without allergic reation? yes  Can the patient blow up a balloon without reaction? No  Is the patient allergic to kiwi, bananas, avocado, or chestnuts? No  Immediate itch or reaction to latex gloves yes  Known allergy to latex (i.e. blood test)? Yes  Does the patient require special undergarments, such as panties/briefs with special waist bandno  Can the patient chew gum without allergic reation? yes  Can the patient blow up a balloon without reaction? No  Is the patient allergic to kiwi, bananas, avocado, or chestnuts? No  Immediate itch or reaction to latex gloves yes  Known allergy to latex (i.e. blood test)? Yes  Does the patient require special undergarments, such as panties/briefs with special waist bandno    Metronidazole Other (See Comments)     Sores on inside of tongue       Current Medications[1]    Past Medical History:   Diagnosis Date    Bipolar disorder     Hypertension      Past Surgical History:   Procedure Laterality Date    CHOLECYSTECTOMY  2008    COLON SURGERY  08/17/2018    HYSTERECTOMY  1984    MOUTH SURGERY  07/2017     oopherectomy  2005    TONSILLECTOMY  1984    TOTAL KNEE ARTHROPLASTY       bilateral     Family History       Problem Relation (Age of Onset)    Heart disease Father, Brother    Hypertension Mother          Social History     Socioeconomic History    Marital status:    Tobacco Use    Smoking status: Never    Smokeless tobacco: Never   Substance and Sexual Activity    Alcohol use: No    Drug use: No    Sexual activity: Never     Partners: Male     Social Drivers of Health     Financial Resource Strain: Low Risk  (8/13/2019)    Received from [facility]    Overall Financial Resource Strain (CARDIA)     Difficulty of Paying Living Expenses: Not hard at all   Food Insecurity: No Food Insecurity (8/13/2019)    Received from [facility] and [facility]    Hunger Vital Sign     Worried About Running Out of Food in the Last Year: Never true     Ran Out of Food in the Last Year: Never true   Transportation Needs: No Transportation Needs (8/13/2019)    Received from [facility]    PRAPARE - Transportation     Lack of Transportation (Medical): No     Lack of Transportation (Non-Medical): No       Review of Systems   Neurological:  Positive for headaches.   All other systems reviewed and are negative.      OBJECTIVE:     Vital Signs     There is no height or weight on file to calculate BMI.    Neurosurgery Physical Exam  On virtual audio visual visit   Head is normocephalic atraumatic   Neck is grossly supple  No appreciable labored breathing   Admitting appears to be nontender   Patient is able to move extremities     On virtual audio visual visit the patient's speech is fluent, goal directed without any noted dysarthria or aphasia   Unable to evaluate pupils on virtual audio visual visit   Face is symmetric   Tongue is midline  Unable to evaluate palate   Hearing appears to be intact on virtual audio visual visit to voice    Patient able to move both upper and lower extremities without any gross motor asymmetry on virtual audio visual visit     Diagnostic Results:  I personally reviewed the patient's Diagnostic Imaging.     IR Angiogram Carotid Cerebral Bilateral 02/13/25  Successful fluoroscopic diagnostic cerebral angiogram shows basilar tip aneurysm measuring 3.5 mm in height, 4.08 mm in width and 4.12 mm neck diameter.    CTA Head and Neck 11/19/24  Basilar artery is within normal limits without focal abnormality. Superiorly projected outpouching arising from the basilar tip in keeping with aneurysm. This is not appreciably changed in size or configuration from the prior. This again measures on the order of 3 mm from neck to dome and 2-3 mm in maximal width. Smooth contour without discrete excrescence.     ASSESSMENT/PLAN:   63 y/o F with basilar tip aneurysm measuring 3.5 mm in height, 4.08 mm in width and 4.12 mm neck diameter.     Patient presents with occasional headaches that are tolerable. No other focal neurological deficits seen here in clinic today.    Discussed imaging results of IR Angiogram Carotid Cerebral Bilateral revealing basilar tip aneurysm measuring 3.5 mm in height, 4.08 mm in width and 4.12 mm neck diameter.  Discussed imaging results of CTA Head and Neck revealing superiorly projected outpouching arising from the basilar tip in keeping with aneurysm. This is not appreciably changed in size or configuration from the prior. This again measures on the order of 3 mm from neck to dome and 2-3 mm in maximal width. Smooth contour without discrete excrescence.     After discussion with the patient and reviewing images, I recommend treatment of the basilar tip aneurysm using coils and stents as there has been demonstrated aneurysm growth. Had long discussion with patient regarding risks, benefits, alternatives of the procedure including but not limited to heart attack, coma, stroke, infection, bleeding, paralysis,  death. I also recommend the patient to begin anti-platelet regimen of aspirin 325 mg and Plavix 75 mg in preparation for the procedure. I have answered all of their questions and patient wish to proceed with this plan. We will schedule patient.      Thank you so very much for allowing me to participate in the care of this patient.  Please feel free to call any questions, comments, or concerns.     Kalpana Ibarra MD,MSc  Department of Neurosurgery   Department of Radiology  Department of Neurology  [facility]    [facility] [employer] [facility]     Total time spent in counseling and discussion about further management options including relevant lab work, treatment,  prognosis, medications and intended side effects was more than 60 minutes. More than 50 % of the time was spent in counseling and coordination of care.  I spent a total of 60 minutes on the day of the visit.This includes face to face time and non-face to face time preparing to see the patient (eg, review of tests), Obtaining and/or reviewing separately obtained history, Documenting clinical information in the electronic or other health record, Independently interpreting resultsand communicating results to the patient/family/caregiver, or Care coordination.     Scribe Attestation  I, Dr. Kalpana Ibarra personally performed the services described in this documentation. All medical record entries made by the scribe, Destini Sung, were at my direction and in my presence.  I have reviewed the chart and agree that the record reflects my personal performance and is accurate and complete.           [1]   Current Outpatient Medications   Medication Sig Dispense Refill    albuterol (PROVENTIL/VENTOLIN HFA) 90 mcg/actuation inhaler Inhale 1-2 puffs into the lungs every 4 (four) hours as needed for Wheezing or Shortness of Breath. Rescue 8 g  0    AMLODIPINE BESYLATE, BULK, MISC Take 5 mg by mouth once daily.      benzocaine-menthoL 6-10 mg lozenge Take 1 lozenge by mouth every 2 (two) hours as needed. 36 tablet 0    busPIRone (BUSPAR) 10 MG tablet Take 10 mg by mouth 2 (two) times daily as needed.      cetirizine (ZYRTEC) 10 MG tablet Take 10 mg by mouth nightly as needed.      chlordiazepoxide (LIBRIUM) 5 MG capsule Take 1 capsule (5 mg total) by mouth 2 (two) times daily. 60 capsule 5    chlorhexidine (PERIDEX) 0.12 % solution RINSE AND SPIT WITH 15ML FOR 30 SECONDS BID  0    hydroCHLOROthiazide (HYDRODIURIL) 12.5 MG Tab Take 12.5 mg by mouth once daily.      hydrocodone-acetaminophen 5-325mg (NORCO) 5-325 mg per tablet 325 tablets.  0    hydrocodone-acetaminophen 7.5-325mg (NORCO) 7.5-325 mg per tablet TK 1 T PO Q 4 HOURS PRF PAIN  0    irbesartan (AVAPRO) 150 MG tablet Take 150 mg by mouth once daily.  0    lisinopril (PRINIVIL,ZESTRIL) 5 MG tablet Take 5 mg by mouth once daily.      lithium (ESKALITH) 300 MG capsule 300 mg.  5    metoprolol succinate (TOPROL-XL) 25 MG 24 hr tablet 25 mg.  3    mupirocin (BACTROBAN) 2 % ointment SMARTSI Milliliter(s) Topical 1-2 Times Daily      predniSONE (DELTASONE) 10 MG tablet Take by mouth.       No current facility-administered medications for this visit.

## 2025-03-20 ENCOUNTER — PATIENT MESSAGE (OUTPATIENT)
Dept: NEUROSURGERY | Facility: CLINIC | Age: 65
End: 2025-03-20
Payer: MEDICARE

## 2025-04-01 RX ORDER — CLOPIDOGREL BISULFATE 75 MG/1
75 TABLET ORAL DAILY
Qty: 30 TABLET | Refills: 11 | Status: SHIPPED | OUTPATIENT
Start: 2025-04-01 | End: 2026-04-01

## 2025-04-01 RX ORDER — ASPIRIN 325 MG
325 TABLET, DELAYED RELEASE (ENTERIC COATED) ORAL DAILY
Start: 2025-04-01 | End: 2026-04-01

## 2025-04-16 ENCOUNTER — TELEPHONE (OUTPATIENT)
Dept: NEUROSURGERY | Facility: CLINIC | Age: 65
End: 2025-04-16
Payer: MEDICARE

## 2025-04-16 NOTE — TELEPHONE ENCOUNTER
----- Message from Genoveva Villanueva sent at 4/15/2025  3:56 PM CDT -----  Regarding: Advise  Contact: [phone]  Type:  AdviceWho Called: PatientWould the patient rather a call back or a response via [facility]? Call [facility] Call Back Number: [phone] Information: Pt is asking for a return call from BRIGETTE Howell in regards to a procedure per pt.

## 2025-04-16 NOTE — TELEPHONE ENCOUNTER
Returned call to pt. Pt very anxious about procedure not until June. Explained I discussed her with Dr. Ibarra yesterday. He is very well aware of her condition. Explained we are looking at the patients and trying to get everyone in according need. Explained that Dr. Ibarra felt that she was not in imminent danger. Also, explained that if a cancellation happens will give her a call to schedule. Pt tearful but understanding.

## 2025-04-29 ENCOUNTER — TELEPHONE (OUTPATIENT)
Dept: NEUROSURGERY | Facility: CLINIC | Age: 65
End: 2025-04-29
Payer: MEDICARE

## 2025-04-29 NOTE — TELEPHONE ENCOUNTER
----- Message from Vanessa sent at 4/29/2025  2:46 PM CDT -----  Regarding: Patient Advice  Contact: Ambreen [phone]  Palette/ daughter is calling to speak Tessa about headaches pt has been having please call

## 2025-04-29 NOTE — TELEPHONE ENCOUNTER
Called and spoke with dtr. Explained I spoke with Dr. Ibarra and he looked at imaging again. Explained that he did not see anything concerning. He stated the headaches are not from the aneurysm. We could refer to neurology for headaches if she would like. She can fly and live her life as normal as possible. Should pt experience the worst headache of her life and is unable to function ED. Pts dtr verbalized understanding.

## 2025-06-24 ENCOUNTER — PATIENT MESSAGE (OUTPATIENT)
Dept: NEUROSURGERY | Facility: CLINIC | Age: 65
End: 2025-06-24
Payer: MEDICARE

## 2025-07-10 ENCOUNTER — PATIENT MESSAGE (OUTPATIENT)
Dept: NEUROSURGERY | Facility: CLINIC | Age: 65
End: 2025-07-10
Payer: MEDICARE

## 2025-07-17 ENCOUNTER — PATIENT MESSAGE (OUTPATIENT)
Dept: INTERVENTIONAL RADIOLOGY/VASCULAR | Facility: HOSPITAL | Age: 65
End: 2025-07-17
Payer: MEDICARE

## 2025-07-18 ENCOUNTER — TELEPHONE (OUTPATIENT)
Dept: NEUROSURGERY | Facility: CLINIC | Age: 65
End: 2025-07-18
Payer: MEDICARE

## 2025-07-18 ENCOUNTER — LAB VISIT (OUTPATIENT)
Dept: LAB | Facility: HOSPITAL | Age: 65
End: 2025-07-18
Attending: NEUROLOGICAL SURGERY
Payer: MEDICARE

## 2025-07-18 ENCOUNTER — PATIENT MESSAGE (OUTPATIENT)
Dept: NEUROSURGERY | Facility: CLINIC | Age: 65
End: 2025-07-18
Payer: MEDICARE

## 2025-07-18 DIAGNOSIS — I67.1 CEREBRAL ANEURYSM, NONRUPTURED: ICD-10-CM

## 2025-07-18 DIAGNOSIS — I67.1 CEREBRAL ANEURYSM, NONRUPTURED: Primary | ICD-10-CM

## 2025-07-18 LAB
ANION GAP (OHS): 9 MMOL/L (ref 8–16)
BUN SERPL-MCNC: 15 MG/DL (ref 8–23)
CALCIUM SERPL-MCNC: 9.9 MG/DL (ref 8.7–10.5)
CHLORIDE SERPL-SCNC: 114 MMOL/L (ref 95–110)
CO2 SERPL-SCNC: 21 MMOL/L (ref 23–29)
CREAT SERPL-MCNC: 1.1 MG/DL (ref 0.5–1.4)
GFR SERPLBLD CREATININE-BSD FMLA CKD-EPI: 56 ML/MIN/1.73/M2
GLUCOSE SERPL-MCNC: 88 MG/DL (ref 70–110)
PA AA PRP-ACNC: 388 ARU (ref 620–672)
PLATELET RESPONSE PLAVIX (OHS): 212 PRU (ref 194–418)
POTASSIUM SERPL-SCNC: 4 MMOL/L (ref 3.5–5.1)
SODIUM SERPL-SCNC: 144 MMOL/L (ref 136–145)

## 2025-07-18 PROCEDURE — 85576 BLOOD PLATELET AGGREGATION: CPT | Mod: 59

## 2025-07-18 PROCEDURE — 36415 COLL VENOUS BLD VENIPUNCTURE: CPT

## 2025-07-18 PROCEDURE — 80048 BASIC METABOLIC PNL TOTAL CA: CPT

## 2025-07-18 PROCEDURE — 85576 BLOOD PLATELET AGGREGATION: CPT

## 2025-07-18 RX ORDER — TICAGRELOR 90 MG/1
90 TABLET, FILM COATED ORAL 2 TIMES DAILY
Qty: 60 TABLET | Refills: 11 | Status: SHIPPED | OUTPATIENT
Start: 2025-07-18 | End: 2025-07-18

## 2025-07-18 RX ORDER — ASPIRIN 81 MG/1
81 TABLET ORAL DAILY
Qty: 30 TABLET | Refills: 11 | Status: SHIPPED | OUTPATIENT
Start: 2025-07-19 | End: 2026-07-19

## 2025-07-18 RX ORDER — TICAGRELOR 90 MG/1
90 TABLET, FILM COATED ORAL 2 TIMES DAILY
Qty: 60 TABLET | Refills: 11 | Status: SHIPPED | OUTPATIENT
Start: 2025-07-19 | End: 2026-07-19

## 2025-07-18 RX ORDER — ASPIRIN 81 MG/1
81 TABLET ORAL DAILY
Qty: 30 TABLET | Refills: 11 | Status: SHIPPED | OUTPATIENT
Start: 2025-07-18 | End: 2025-07-18

## 2025-07-18 NOTE — TELEPHONE ENCOUNTER
Returned call to pt. Informed that we are waiting for the result and will call pt if something abnormal. Pt v/u

## 2025-07-18 NOTE — TELEPHONE ENCOUNTER
Copied from CRM #[id number]. Topic: General Inquiry - Patient Advice  >> Jul 18, 2025  9:30 AM Carmelita wrote:  Type:  Patient Advise    Who Called: Pt    Reason For call:Pt called states wld like to speak with someone in regards to Labs she had done on this Morning. Please advise     Would the patient rather a call back or a response via [facility]? Call    Best Call Back Number: Telephone Information:  Mobile          [phone]

## 2025-07-24 ENCOUNTER — ANESTHESIA EVENT (OUTPATIENT)
Dept: INTERVENTIONAL RADIOLOGY/VASCULAR | Facility: HOSPITAL | Age: 65
End: 2025-07-24
Payer: MEDICARE

## 2025-07-24 ENCOUNTER — HOSPITAL ENCOUNTER (INPATIENT)
Dept: INTERVENTIONAL RADIOLOGY/VASCULAR | Facility: HOSPITAL | Age: 65
LOS: 1 days | Discharge: HOME | DRG: 272 | End: 2025-07-25
Attending: NEUROLOGICAL SURGERY | Admitting: PSYCHIATRY & NEUROLOGY
Payer: MEDICARE

## 2025-07-24 DIAGNOSIS — I67.1 CEREBRAL ANEURYSM, NONRUPTURED: Primary | ICD-10-CM

## 2025-07-24 LAB
ABSOLUTE EOSINOPHIL (OHS): 0 K/UL
ABSOLUTE MONOCYTE (OHS): 0.12 K/UL (ref 0.3–1)
ABSOLUTE NEUTROPHIL COUNT (OHS): 6.44 K/UL (ref 1.8–7.7)
ALBUMIN SERPL BCP-MCNC: 3.5 G/DL (ref 3.5–5.2)
ALP SERPL-CCNC: 69 UNIT/L (ref 40–150)
ALT SERPL W/O P-5'-P-CCNC: 42 UNIT/L (ref 0–55)
ANION GAP (OHS): 8 MMOL/L (ref 8–16)
AST SERPL-CCNC: 52 UNIT/L (ref 0–50)
BASOPHILS # BLD AUTO: 0.01 K/UL
BASOPHILS NFR BLD AUTO: 0.1 %
BILIRUB SERPL-MCNC: 0.5 MG/DL (ref 0.1–1)
BUN SERPL-MCNC: 15 MG/DL (ref 8–23)
CALCIUM SERPL-MCNC: 8.7 MG/DL (ref 8.7–10.5)
CHLORIDE SERPL-SCNC: 116 MMOL/L (ref 95–110)
CO2 SERPL-SCNC: 19 MMOL/L (ref 23–29)
CREAT SERPL-MCNC: 1.1 MG/DL (ref 0.5–1.4)
ERYTHROCYTE [DISTWIDTH] IN BLOOD BY AUTOMATED COUNT: 13.2 % (ref 11.5–14.5)
GFR SERPLBLD CREATININE-BSD FMLA CKD-EPI: 56 ML/MIN/1.73/M2
GLUCOSE SERPL-MCNC: 143 MG/DL (ref 70–110)
HCT VFR BLD AUTO: 38.1 % (ref 37–48.5)
HGB BLD-MCNC: 12.4 GM/DL (ref 12–16)
IMM GRANULOCYTES # BLD AUTO: 0.01 K/UL (ref 0–0.04)
IMM GRANULOCYTES NFR BLD AUTO: 0.1 % (ref 0–0.5)
LYMPHOCYTES # BLD AUTO: 0.91 K/UL (ref 1–4.8)
MCH RBC QN AUTO: 28.2 PG (ref 27–31)
MCHC RBC AUTO-ENTMCNC: 32.5 G/DL (ref 32–36)
MCV RBC AUTO: 87 FL (ref 82–98)
NUCLEATED RBC (/100WBC) (OHS): 0 /100 WBC
PLATELET # BLD AUTO: 196 K/UL (ref 150–450)
PMV BLD AUTO: 10.4 FL (ref 9.2–12.9)
POTASSIUM SERPL-SCNC: 4.3 MMOL/L (ref 3.5–5.1)
PROT SERPL-MCNC: 6.4 GM/DL (ref 6–8.4)
RBC # BLD AUTO: 4.4 M/UL (ref 4–5.4)
RELATIVE EOSINOPHIL (OHS): 0 %
RELATIVE LYMPHOCYTE (OHS): 12.1 % (ref 18–48)
RELATIVE MONOCYTE (OHS): 1.6 % (ref 4–15)
RELATIVE NEUTROPHIL (OHS): 86.1 % (ref 38–73)
SODIUM SERPL-SCNC: 143 MMOL/L (ref 136–145)
WBC # BLD AUTO: 7.49 K/UL (ref 3.9–12.7)

## 2025-07-24 PROCEDURE — C1894 INTRO/SHEATH, NON-LASER: HCPCS

## 2025-07-24 PROCEDURE — 25000003 PHARM REV CODE 250

## 2025-07-24 PROCEDURE — 25000003 PHARM REV CODE 250: Performed by: FAMILY MEDICINE

## 2025-07-24 PROCEDURE — 63600175 PHARM REV CODE 636 W HCPCS: Performed by: STUDENT IN AN ORGANIZED HEALTH CARE EDUCATION/TRAINING PROGRAM

## 2025-07-24 PROCEDURE — 20000000 HC ICU ROOM

## 2025-07-24 PROCEDURE — 25000003 PHARM REV CODE 250: Performed by: NEUROLOGICAL SURGERY

## 2025-07-24 PROCEDURE — 85025 COMPLETE CBC W/AUTO DIFF WBC: CPT | Performed by: PHYSICIAN ASSISTANT

## 2025-07-24 PROCEDURE — 80053 COMPREHEN METABOLIC PANEL: CPT | Performed by: PHYSICIAN ASSISTANT

## 2025-07-24 PROCEDURE — 99223 1ST HOSP IP/OBS HIGH 75: CPT | Mod: ,,, | Performed by: STUDENT IN AN ORGANIZED HEALTH CARE EDUCATION/TRAINING PROGRAM

## 2025-07-24 PROCEDURE — 25000003 PHARM REV CODE 250: Performed by: REGISTERED NURSE

## 2025-07-24 PROCEDURE — 37000008 HC ANESTHESIA 1ST 15 MINUTES

## 2025-07-24 PROCEDURE — 99291 CRITICAL CARE FIRST HOUR: CPT | Mod: ,,, | Performed by: PHYSICIAN ASSISTANT

## 2025-07-24 PROCEDURE — C1887 CATHETER, GUIDING: HCPCS

## 2025-07-24 PROCEDURE — 37000009 HC ANESTHESIA EA ADD 15 MINS

## 2025-07-24 PROCEDURE — 25500020 PHARM REV CODE 255: Performed by: NEUROLOGICAL SURGERY

## 2025-07-24 PROCEDURE — 25000003 PHARM REV CODE 250: Performed by: STUDENT IN AN ORGANIZED HEALTH CARE EDUCATION/TRAINING PROGRAM

## 2025-07-24 PROCEDURE — 27800903 OPTIME MED/SURG SUP & DEVICES OTHER IMPLANTS

## 2025-07-24 PROCEDURE — 63600175 PHARM REV CODE 636 W HCPCS: Performed by: REGISTERED NURSE

## 2025-07-24 PROCEDURE — 25000003 PHARM REV CODE 250: Performed by: PHYSICIAN ASSISTANT

## 2025-07-24 PROCEDURE — C1769 GUIDE WIRE: HCPCS

## 2025-07-24 PROCEDURE — 03VG3DZ RESTRICTION OF INTRACRANIAL ARTERY WITH INTRALUMINAL DEVICE, PERCUTANEOUS APPROACH: ICD-10-PCS | Performed by: NEUROLOGICAL SURGERY

## 2025-07-24 PROCEDURE — B31D1ZZ FLUOROSCOPY OF RIGHT VERTEBRAL ARTERY USING LOW OSMOLAR CONTRAST: ICD-10-PCS | Performed by: NEUROLOGICAL SURGERY

## 2025-07-24 RX ORDER — DEXMEDETOMIDINE HYDROCHLORIDE 100 UG/ML
INJECTION, SOLUTION INTRAVENOUS
Status: DISCONTINUED | OUTPATIENT
Start: 2025-07-24 | End: 2025-07-24

## 2025-07-24 RX ORDER — ONDANSETRON HYDROCHLORIDE 2 MG/ML
INJECTION, SOLUTION INTRAVENOUS
Status: DISCONTINUED | OUTPATIENT
Start: 2025-07-24 | End: 2025-07-24

## 2025-07-24 RX ORDER — IODIXANOL 320 MG/ML
200 INJECTION, SOLUTION INTRAVASCULAR
Status: COMPLETED | OUTPATIENT
Start: 2025-07-24 | End: 2025-07-24

## 2025-07-24 RX ORDER — GLUCAGON 1 MG
1 KIT INJECTION
OUTPATIENT
Start: 2025-07-24

## 2025-07-24 RX ORDER — VERAPAMIL HYDROCHLORIDE 2.5 MG/ML
INJECTION INTRAVENOUS
Status: COMPLETED | OUTPATIENT
Start: 2025-07-24 | End: 2025-07-24

## 2025-07-24 RX ORDER — HALOPERIDOL LACTATE 5 MG/ML
0.5 INJECTION, SOLUTION INTRAMUSCULAR EVERY 10 MIN PRN
OUTPATIENT
Start: 2025-07-24

## 2025-07-24 RX ORDER — SODIUM CHLORIDE 0.9 % (FLUSH) 0.9 %
10 SYRINGE (ML) INJECTION
Status: DISCONTINUED | OUTPATIENT
Start: 2025-07-24 | End: 2025-07-25

## 2025-07-24 RX ORDER — LOSARTAN POTASSIUM 50 MG/1
50 TABLET ORAL DAILY
Status: DISCONTINUED | OUTPATIENT
Start: 2025-07-25 | End: 2025-07-25 | Stop reason: HOSPADM

## 2025-07-24 RX ORDER — SODIUM CHLORIDE 0.9 % (FLUSH) 0.9 %
3 SYRINGE (ML) INJECTION
OUTPATIENT
Start: 2025-07-24

## 2025-07-24 RX ORDER — ACETAMINOPHEN 325 MG/1
650 TABLET ORAL EVERY 6 HOURS PRN
Status: DISCONTINUED | OUTPATIENT
Start: 2025-07-24 | End: 2025-07-25 | Stop reason: HOSPADM

## 2025-07-24 RX ORDER — HYDROMORPHONE HYDROCHLORIDE 1 MG/ML
0.2 INJECTION, SOLUTION INTRAMUSCULAR; INTRAVENOUS; SUBCUTANEOUS EVERY 5 MIN PRN
Refills: 0 | OUTPATIENT
Start: 2025-07-24

## 2025-07-24 RX ORDER — MUPIROCIN 20 MG/G
OINTMENT TOPICAL 2 TIMES DAILY
Status: DISCONTINUED | OUTPATIENT
Start: 2025-07-24 | End: 2025-07-25 | Stop reason: HOSPADM

## 2025-07-24 RX ORDER — ASPIRIN 81 MG/1
81 TABLET ORAL DAILY
Status: DISCONTINUED | OUTPATIENT
Start: 2025-07-25 | End: 2025-07-25 | Stop reason: HOSPADM

## 2025-07-24 RX ORDER — MIDAZOLAM HYDROCHLORIDE 1 MG/ML
INJECTION INTRAMUSCULAR; INTRAVENOUS
Status: DISCONTINUED | OUTPATIENT
Start: 2025-07-24 | End: 2025-07-24

## 2025-07-24 RX ORDER — SODIUM CHLORIDE 0.9 % (FLUSH) 0.9 %
10 SYRINGE (ML) INJECTION
Status: DISCONTINUED | OUTPATIENT
Start: 2025-07-24 | End: 2025-07-25 | Stop reason: HOSPADM

## 2025-07-24 RX ORDER — LABETALOL HCL 20 MG/4 ML
10 SYRINGE (ML) INTRAVENOUS EVERY 4 HOURS PRN
Status: DISCONTINUED | OUTPATIENT
Start: 2025-07-24 | End: 2025-07-25 | Stop reason: HOSPADM

## 2025-07-24 RX ORDER — HEPARIN SODIUM 1000 [USP'U]/ML
INJECTION, SOLUTION INTRAVENOUS; SUBCUTANEOUS
Status: COMPLETED | OUTPATIENT
Start: 2025-07-24 | End: 2025-07-24

## 2025-07-24 RX ORDER — SODIUM CHLORIDE 9 MG/ML
INJECTION, SOLUTION INTRAVENOUS CONTINUOUS
Status: DISCONTINUED | OUTPATIENT
Start: 2025-07-24 | End: 2025-07-25

## 2025-07-24 RX ORDER — PROPOFOL 10 MG/ML
VIAL (ML) INTRAVENOUS
Status: DISCONTINUED | OUTPATIENT
Start: 2025-07-24 | End: 2025-07-24

## 2025-07-24 RX ORDER — HEPARIN SODIUM 1000 [USP'U]/ML
INJECTION, SOLUTION INTRAVENOUS; SUBCUTANEOUS
Status: DISCONTINUED | OUTPATIENT
Start: 2025-07-24 | End: 2025-07-24

## 2025-07-24 RX ORDER — LIDOCAINE HYDROCHLORIDE 20 MG/ML
INJECTION INTRAVENOUS
Status: DISCONTINUED | OUTPATIENT
Start: 2025-07-24 | End: 2025-07-24

## 2025-07-24 RX ORDER — FENTANYL CITRATE 50 UG/ML
INJECTION, SOLUTION INTRAMUSCULAR; INTRAVENOUS
Status: DISCONTINUED | OUTPATIENT
Start: 2025-07-24 | End: 2025-07-24

## 2025-07-24 RX ORDER — TICAGRELOR 90 MG/1
90 TABLET, FILM COATED ORAL 2 TIMES DAILY
Status: DISCONTINUED | OUTPATIENT
Start: 2025-07-24 | End: 2025-07-25 | Stop reason: HOSPADM

## 2025-07-24 RX ORDER — ROCURONIUM BROMIDE 10 MG/ML
INJECTION, SOLUTION INTRAVENOUS
Status: DISCONTINUED | OUTPATIENT
Start: 2025-07-24 | End: 2025-07-24

## 2025-07-24 RX ORDER — HYDRALAZINE HYDROCHLORIDE 20 MG/ML
10 INJECTION INTRAMUSCULAR; INTRAVENOUS EVERY 4 HOURS PRN
Status: DISCONTINUED | OUTPATIENT
Start: 2025-07-24 | End: 2025-07-25 | Stop reason: HOSPADM

## 2025-07-24 RX ORDER — PROCHLORPERAZINE EDISYLATE 5 MG/ML
5 INJECTION INTRAMUSCULAR; INTRAVENOUS EVERY 30 MIN PRN
OUTPATIENT
Start: 2025-07-24

## 2025-07-24 RX ORDER — DEXAMETHASONE SODIUM PHOSPHATE 4 MG/ML
INJECTION, SOLUTION INTRA-ARTICULAR; INTRALESIONAL; INTRAMUSCULAR; INTRAVENOUS; SOFT TISSUE
Status: DISCONTINUED | OUTPATIENT
Start: 2025-07-24 | End: 2025-07-24

## 2025-07-24 RX ADMIN — LIDOCAINE HYDROCHLORIDE 60 MG: 20 INJECTION INTRAVENOUS at 12:07

## 2025-07-24 RX ADMIN — DEXMEDETOMIDINE 4 MCG: 200 INJECTION, SOLUTION INTRAVENOUS at 03:07

## 2025-07-24 RX ADMIN — ACETAMINOPHEN 650 MG: 325 TABLET ORAL at 10:07

## 2025-07-24 RX ADMIN — PROPOFOL 130 MG: 10 INJECTION, EMULSION INTRAVENOUS at 12:07

## 2025-07-24 RX ADMIN — SUGAMMADEX 200 MG: 100 INJECTION, SOLUTION INTRAVENOUS at 03:07

## 2025-07-24 RX ADMIN — VERAPAMIL HYDROCHLORIDE 20 MG: 2.5 INJECTION, SOLUTION INTRAVENOUS at 01:07

## 2025-07-24 RX ADMIN — FENTANYL CITRATE 50 MCG: 50 INJECTION, SOLUTION INTRAMUSCULAR; INTRAVENOUS at 12:07

## 2025-07-24 RX ADMIN — SODIUM CHLORIDE, SODIUM GLUCONATE, SODIUM ACETATE, POTASSIUM CHLORIDE, MAGNESIUM CHLORIDE, SODIUM PHOSPHATE, DIBASIC, AND POTASSIUM PHOSPHATE: .53; .5; .37; .037; .03; .012; .00082 INJECTION, SOLUTION INTRAVENOUS at 12:07

## 2025-07-24 RX ADMIN — ROCURONIUM BROMIDE 10 MG: 10 INJECTION, SOLUTION INTRAVENOUS at 03:07

## 2025-07-24 RX ADMIN — DEXMEDETOMIDINE 8 MCG: 200 INJECTION, SOLUTION INTRAVENOUS at 03:07

## 2025-07-24 RX ADMIN — MIDAZOLAM HYDROCHLORIDE 2 MG: 2 INJECTION, SOLUTION INTRAMUSCULAR; INTRAVENOUS at 12:07

## 2025-07-24 RX ADMIN — IODIXANOL 250 ML: 320 INJECTION, SOLUTION INTRAVASCULAR at 03:07

## 2025-07-24 RX ADMIN — HEPARIN SODIUM 500 UNITS: 1000 INJECTION, SOLUTION INTRAVENOUS; SUBCUTANEOUS at 02:07

## 2025-07-24 RX ADMIN — SODIUM CHLORIDE: 0.9 INJECTION, SOLUTION INTRAVENOUS at 12:07

## 2025-07-24 RX ADMIN — ROCURONIUM BROMIDE 50 MG: 10 INJECTION, SOLUTION INTRAVENOUS at 12:07

## 2025-07-24 RX ADMIN — ROCURONIUM BROMIDE 10 MG: 10 INJECTION, SOLUTION INTRAVENOUS at 02:07

## 2025-07-24 RX ADMIN — MUPIROCIN: 20 OINTMENT TOPICAL at 08:07

## 2025-07-24 RX ADMIN — TICAGRELOR 90 MG: 90 TABLET ORAL at 08:07

## 2025-07-24 RX ADMIN — HEPARIN SODIUM 2000 UNITS: 1000 INJECTION, SOLUTION INTRAVENOUS; SUBCUTANEOUS at 02:07

## 2025-07-24 RX ADMIN — FENTANYL CITRATE 50 MCG: 50 INJECTION, SOLUTION INTRAMUSCULAR; INTRAVENOUS at 01:07

## 2025-07-24 RX ADMIN — ONDANSETRON 4 MG: 2 INJECTION INTRAMUSCULAR; INTRAVENOUS at 03:07

## 2025-07-24 RX ADMIN — VERAPAMIL HYDROCHLORIDE 10 MG: 2.5 INJECTION, SOLUTION INTRAVENOUS at 01:07

## 2025-07-24 RX ADMIN — DEXAMETHASONE SODIUM PHOSPHATE 4 MG: 4 INJECTION, SOLUTION INTRAMUSCULAR; INTRAVENOUS at 01:07

## 2025-07-24 RX ADMIN — HEPARIN SODIUM 3000 UNITS: 1000 INJECTION, SOLUTION INTRAVENOUS; SUBCUTANEOUS at 01:07

## 2025-07-24 RX ADMIN — ROCURONIUM BROMIDE 20 MG: 10 INJECTION, SOLUTION INTRAVENOUS at 01:07

## 2025-07-24 NOTE — ANESTHESIA PREPROCEDURE EVALUATION
07/24/2025  Carrol Molina is a 64 y.o., female for coils. Patient with aneurysm of basilar artery.    Patient Active Problem List   Diagnosis    Bipolar disorder in full remission    Hypertension    Orthostatic tremor    Aneurysm of basilar artery           Pre-op Assessment    I have reviewed the Patient Summary Reports.     I have reviewed the Nursing Notes. I have reviewed the NPO Status.   I have reviewed the Medications.     Review of Systems      Physical Exam  General: Well nourished    Airway:  Mallampati: I / I  Mouth Opening: Normal  TM Distance: Normal  Tongue: Normal  Neck ROM: Normal ROM    Dental:  Intact        Anesthesia Plan  Type of Anesthesia, risks & benefits discussed:    Anesthesia Type: Gen ETT  Intra-op Monitoring Plan: Standard ASA Monitors  Post Op Pain Control Plan: multimodal analgesia  Induction:  IV  Airway Plan: Direct, Post-Induction  Informed Consent: Informed consent signed with the Patient and all parties understand the risks and agree with anesthesia plan.  All questions answered. Patient consented to blood products? Yes  ASA Score: 2  Day of Surgery Review of History & Physical: H&P Update referred to the surgeon/provider.    Ready For Surgery From Anesthesia Perspective.     .    BMP  Lab Results   Component Value Date     07/18/2025    K 4.0 07/18/2025     (H) 07/18/2025    CO2 21 (L) 07/18/2025    BUN 15 07/18/2025    CREATININE 1.1 07/18/2025    CALCIUM 9.9 07/18/2025    ANIONGAP 9 07/18/2025    EGFRNORACEVR 56 (L) 07/18/2025       Imaging  IR Angiogram Carotid Cerebral Bilateral 02/13/25  Successful fluoroscopic diagnostic cerebral angiogram shows basilar tip aneurysm measuring 3.5 mm in height, 4.08 mm in width and 4.12 mm neck diameter.     CTA Head and Neck 11/19/24  Basilar artery is within normal limits without focal abnormality. Superiorly  projected outpouching arising from the basilar tip in keeping with aneurysm. This is not appreciably changed in size or configuration from the prior. This again measures on the order of 3 mm from neck to dome and 2-3 mm in maximal width. Smooth contour without discrete excrescence.

## 2025-07-24 NOTE — ASSESSMENT & PLAN NOTE
65 y/o with basilar tip aneurysm initially incidentally found in 2017 during orthostatic tremor w/u. Aneurysm noted to have grown on recent MRA in 2024, and patient referred for intervention. She is now s/p stent-assisted coiling of the basilar tip aneurysm.     - Admit to NCC   - Hourly neuro checks  - Continue DAPT with ASA and ticagrelor.   - patient reports subjective intolerance of ticagrelor, that she does not like the way it makes her feel. Patient reports she was previously on Plavix and asa, but the Plavix was ineffective. platelet response assay on 7/18 showed 212 PRU  - Hourly neurovascular checks  - SBP goal <140, PRNs available

## 2025-07-24 NOTE — EICU
Intervention Initiated From:  [facility] / EICU    Cristopher intervened regarding:  Rounding (Video assessment)    Virtual ICU Admission    Admit Date: 2025  LOS: 0  Code Status: Full Code   : 1960  Bed: 9077/9077 A:     Diagnosis: <principal problem not specified>    Patient  has a past medical history of Bipolar disorder, H/O angiography, and Hypertension.    Last VS: /77 (BP Location: Left arm, Patient Position: Lying)   Pulse (!) 59   Temp 97.6 °F (36.4 °C) (Oral)   Resp 18   SpO2 98%   Breastfeeding No       VICU Review    VICU nurse assessment :  [place] IRA completed, LDA documentation reconciliation completed, and VTE prophylaxis review        Nursing orders placed : IP SANDRINE Peripheral IV Access

## 2025-07-24 NOTE — CONSULTS
[facility] Critical [facility]  Neurosurgery  Consult Note    Consults  Subjective:     Chief Complaint/Reason for Admission: Scheduled endovascular stent assisted coiling    History of Present Illness: Carrol Molina is a 64 y.o. 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 She has been followed in clinic by Dr. Ibarra for a basilar tip aneurysm measuring 3.5 mm in height, 4.08 mm in width and 4.12 mm neck diameter.  After undergoing DSA in February, she was recommended to undergo treatment and opted to proceed. She tolerated the procedure without complication by radial access.  She is admitted to neuro ICU for close postprocedure monitoring.  She is doing well postoperatively.  She denies headaches, vision changes, nausea, vomiting, focal weakness, or sensory dysfunction.     Prescriptions Prior to Admission[1]    Review of patient's allergies indicates:   Allergen Reactions    Codeine Hives    Diphenhydramine Other (See Comments)     chest pains  Other reaction(s): Other (See Comments), Unknown  chest pains  chest pains    Tolmetin Other (See Comments)     stomach pains.  Other reaction(s): Unknown  stomach pains.    Latex Itching and Swelling     Can the patient chew gum without allergic reation? yes  Can the patient blow up a balloon without reaction? No  Is the patient allergic to kiwi, bananas, avocado, or chestnuts? No  Immediate itch or reaction to latex gloves yes  Known allergy to latex (i.e. blood test)? Yes  Does the patient require special undergarments, such as panties/briefs with special waist bandno  Can the patient chew gum without allergic reation? yes  Can the patient blow up a balloon without reaction? No  Is the patient allergic to kiwi, bananas, avocado, or chestnuts? No  Immediate itch or reaction to latex gloves yes  Known allergy to latex (i.e. blood test)? Yes  Does the patient require special undergarments, such as panties/briefs  with special waist bandno  Can the patient chew gum without allergic reation? yes  Can the patient blow up a balloon without reaction? No  Is the patient allergic to kiwi, bananas, avocado, or chestnuts? No  Immediate itch or reaction to latex gloves yes  Known allergy to latex (i.e. blood test)? Yes  Does the patient require special undergarments, such as panties/briefs with special waist bandno    Metronidazole Other (See Comments)     Sores on inside of tongue       Past Medical History:   Diagnosis Date    Bipolar disorder     H/O angiography     Feb 2025    Hypertension      Past Surgical History:   Procedure Laterality Date    CHOLECYSTECTOMY  2008    COLON SURGERY  08/17/2018    HYSTERECTOMY  1984    MOUTH SURGERY  07/2017    oopherectomy  2005    TONSILLECTOMY  1984    TOTAL KNEE ARTHROPLASTY       bilateral     Family History       Problem Relation (Age of Onset)    Heart disease Father, Brother    Hypertension Mother          Tobacco Use    Smoking status: Never    Smokeless tobacco: Never   Vaping Use    Vaping status: Never Used   Substance and Sexual Activity    Alcohol use: No    Drug use: No    Sexual activity: Never     Partners: Male     Comment: lives with family     Review of Systems  Objective:     Weight: 88.5 kg (195 lb)  Body mass index is 33.47 kg/m².  Vital Signs (Most Recent):  Temp: 97.6 °F (36.4 °C) (07/24/25 1618)  Pulse: (!) 56 (07/24/25 1732)  Resp: (!) 28 (07/24/25 1703)  BP: (!) 145/63 (07/24/25 1703)  SpO2: 99 % (07/24/25 1703) Vital Signs (24h Range):  Temp:  [97.6 °F (36.4 °C)-97.9 °F (36.6 °C)] 97.6 °F (36.4 °C)  Pulse:  [56-69] 56  Resp:  [18-33] 28  SpO2:  [98 %-99 %] 99 %  BP: (130-145)/(63-82) 145/63     Date 07/24/25 0700 - 07/25/25 0659   Shift 4005-8941 6570-3019 3333-2025 24 Hour Total   INTAKE   I.V. 400   400(4.5)   IV Piggyback 250 250  500   Shift Total(mL/kg) 650 250(2.8)  900(10.2)   OUTPUT   Shift Total(mL/kg)       Weight (kg)  88.5 88.5 88.5              Resp  "Rate Total:  [98 br/min] 98 br/min    Neurosurgery Physical Exam  General: well developed, well nourished, no distress.   Head: normocephalic, atraumatic  Mental Status: Awake, Alert, Oriented x 4  Speech: Clear with content appropriate to conversation  Cranial nerves: face symmetric, CN II-XII grossly intact.   Eyes: pupils equal, round, reactive to light, EOMI.  Sensory: intact to light touch throughout    Motor Strength: Moves all extremities spontaneously with good tone.  Full strength upper and lower extremities. No abnormal movements seen.     Pronator Drift: no drift noted  Finger-to-nose: Intact bilaterally    Right radial access site with clean dry dressing    Significant Labs:  No results for input(s): "GLU", "NA", "K", "CL", "CO2", "BUN", "CREATININE", "CALCIUM", "MG" in the last 48 hours.  No results for input(s): "WBC", "HGB", "HCT", "PLT" in the last 48 hours.  No results for input(s): "LABPT", "INR", "APTT" in the last 48 hours.  Microbiology Results (last 7 days)       ** No results found for the last 168 hours. **          All pertinent labs from the last 24 hours have been reviewed.    Significant Diagnostics:  I have reviewed and interpreted all pertinent imaging results/findings within the past 24 hours.    Assessment/Plan:     Aneurysm of basilar artery  Carrol Molina is a 64 y.o. 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 She has been followed in clinic by Dr. Ibarra for a basilar tip aneurysm measuring 3.5 mm in height, 4.08 mm in width and 4.12 mm neck diameter.  After undergoing DSA in February, she was recommended to undergo treatment and opted to proceed. She tolerated the procedure without complication by radial access.      - Admitted to [facility] with q1 hour neuro checks  - Post procedure care per Neuro IR  - DAPT per Neuro IR  - Notify NSGY with any decline on exam     Case and plan discussed with attending neurosurgeon Dr." Fred        Thank you for your consult.     Katie Bui PA-C  Neurosurgery  [facility] - [facility] Critical [facility]       [1]   Medications Prior to Admission   Medication Sig Dispense Refill Last Dose/Taking    aspirin (ECOTRIN) 81 MG EC tablet Take 1 tablet (81 mg total) by mouth once daily. 30 tablet 11 7/24/2025 at  7:30 AM    cetirizine (ZYRTEC) 10 MG tablet Take 10 mg by mouth nightly as needed.   Past Week    irbesartan (AVAPRO) 150 MG tablet Take 150 mg by mouth once daily.  0 7/23/2025 at  6:30 AM    ticagrelor (BRILINTA) 90 mg tablet Take 1 tablet (90 mg total) by mouth 2 (two) times daily. 60 tablet 11 7/24/2025 at  7:30 AM

## 2025-07-24 NOTE — TRANSFER OF CARE
Anesthesia Transfer of Care Note    Patient: Carrol Molina    Procedure(s) Performed: * No procedures listed *    Patient location: ICU    Anesthesia Type: general    Transport from OR: Transported from OR on 6-10 L/min O2 by face mask with adequate spontaneous ventilation. Continuous ECG monitoring in transport. Continuous SpO2 monitoring in transport. Continuos invasive BP monitoring in transport    Post pain: adequate analgesia    Post assessment: no apparent anesthetic complications and tolerated procedure well    Post vital signs: stable    Level of consciousness: awake and alert    Nausea/Vomiting: no nausea/vomiting    Complications: none    Transfer of care protocol was followedComments: Report given to Neuro ICU nurse over the phone and at BS. Patient awake,alert,and oriented. 6L O2 simple facemask and no drips. All questions answered.       Last vitals: Visit Vitals  /66 (BP Location: Left arm, Patient Position: Sitting)   Pulse 69   Temp 36.6 °C (97.9 °F) (Temporal)   Resp (!) 22   SpO2 98%   Breastfeeding No

## 2025-07-24 NOTE — CARE UPDATE
Patient arrived to [facility] from IR by ICU bed     Report received from: CRNA and IR RN     Type of stroke/diagnosis: Cerebral angiogram with aneurysm coil and stenting     End time 1533, TR band air instilled at 1533, 11c of air instilled    Current symptoms: Moving all extremities x4, following commands.     Skin Assessment done: Y  Wounds noted: None   Skin Assessment Verified by: BRIGETTE Valderrama Completed? Y    Patient Belongings on Admit: None with family     NCC notified: Keena LUTZ

## 2025-07-24 NOTE — PLAN OF CARE
Pt arrived to IR Room 4 for cerebral angiogram/embolization with anesthesia. Pt oriented to unit and staff. Plan of care reviewed with patient, patient verbalizes understanding. Comfort measures utilized. Pt safely transferred from stretcher to procedural table. Fall risk reviewed with patient, fall risk interventions maintained. Safety strap applied, positioner pillows utilized to minimize pressure points. Blankets applied. Pt prepped and draped utilizing standard sterile technique. Patient placed on continuous monitoring, as required by sedation policy. Timeouts completed utilizing standard universal time-out, per department and facility policy. CRNA to remain at bedside, continuous monitoring maintained. Pt resting comfortably. Denies pain/discomfort. Will continue to monitor. See anesthesia flow sheets for monitoring, medication administration, and updates.

## 2025-07-24 NOTE — H&P
[facility] Critical [facility]  Neurocritical Care  History & Physical    Admit Date: 7/24/2025  Service Date: 07/24/2025  Length of Stay: 0    Subjective:     Chief Complaint: Aneurysm of basilar artery    History of Present Illness: Ms. Molina is a 63 y/o female with PMH significant for basilar tip aneurysm (initially diagnosed in 2017 during work-up for orthostatic tremor), and HTN [place] [facility] [place] of the basilar tip aneurysm.     Patient was followed by her neurologist in [place]. He noted in October of 2024 that Ms. Ochoa's basilar tip aneurysm had increased slightly in size from imaging in 2017. She was referred to Dr. Ibarra for possible intervention at that time.     Patient is now s/p stent-assisted coiling of the basilar tip aneurysm. She will be monitored in ICU overnight.     Past Medical History:   Diagnosis Date    Bipolar disorder     H/O angiography     Feb 2025    Hypertension      Past Surgical History:   Procedure Laterality Date    CHOLECYSTECTOMY  2008    COLON SURGERY  08/17/2018    HYSTERECTOMY  1984    MOUTH SURGERY  07/2017    oopherectomy  2005    TONSILLECTOMY  1984    TOTAL KNEE ARTHROPLASTY       bilateral       Medications Ordered Prior to Encounter[1]  Allergies: Codeine, Diphenhydramine, Tolmetin, Latex, and Metronidazole    Family History   Problem Relation Name Age of Onset    Hypertension Mother      Heart disease Father      Heart disease Brother         Social History[2]   Review of Systems: Denies headache, weakness, numbness. Reports shivering and feeling cold.     Vitals:   Temp: 97.6 °F (36.4 °C)  Pulse: 63  Rhythm: normal sinus rhythm  BP: (!) 142/65  MAP (mmHg): 94  Resp: (!) 25  SpO2: 99 %    Temp  Min: 97.6 °F (36.4 °C)  Max: 97.9 °F (36.6 °C)  Pulse  Min: 56  Max: 69  BP  Min: 130/77  Max: 145/63  MAP (mmHg)  Min: 91  Max: 105  Resp  Min: 18  Max: 33  SpO2  Min: 98 %  Max: 100 %    No intake/output data recorded.           Examination:    Constitutional: Well-nourished and -developed. No apparent distress.   Eyes: Conjunctiva clear, anicteric. Lids no lesions.  Head/Ears/Nose/Mouth/Throat/Neck: Moist mucous membranes. External ears, nose atraumatic.   Cardiovascular: Regular rhythm. R hand warm, well perfused.   Respiratory: Comfortable respirations.     Neurologic:  -GCS [id number]  -Alert. Oriented to person, place, and time. Speech fluent. Follows commands.  -Cranial nerves II-XII intact, particularly PERRL, EOMI, facial expression and sensation symmetric, tongue midline, shoulder shrug symmetric   -Motor 5/5 in all four extremities   -Sensation intact to light touch    Today I independently reviewed pertinent medications, imaging, laboratory results, notably:   - home medications reviewed  - platelet response assays reviewed      Assessment/Plan:     Neuro  * Aneurysm of basilar artery  63 y/o with basilar tip aneurysm initially incidentally found in 2017 during orthostatic tremor w/u. Aneurysm noted to have grown on recent MRA in 2024, and patient referred for intervention. She is now s/p stent-assisted coiling of the basilar tip aneurysm.     - Admit to NCC   - Hourly neuro checks  - Continue DAPT with ASA and ticagrelor.   - patient reports subjective intolerance of ticagrelor, that she does not like the way it makes her feel. Patient reports she was previously on Plavix and asa, but the Plavix was ineffective. platelet response assay on 7/18 showed 212 PRU  - Hourly neurovascular checks  - SBP goal <140, PRNs available     Cardiac/Vascular  Hypertension  SBP goal <140   - Home antihypertensives reordered  - PRNs available           The patient is being Prophylaxed for:  Venous Thromboembolism with: Mechanical  Stress Ulcer with: None  Ventilator Pneumonia with: not applicable    Activity Orders            Progressive Mobility Protocol (mobilize patient to their highest level of functioning at least twice daily) starting at 07/24 2000     Progressive Mobility Protocol (mobilize patient to their highest level of functioning at least twice daily) starting at 07/24 2000    Diet Adult Regular: Regular starting at 07/24 1750          Full Code    Critical care time spent on the evaluation and treatment of severe organ dysfunction, review of pertinent labs and imaging studies, discussions with consulting providers and discussions with patient/family: 30 minutes.      Sara Mazariegos PA-C  Neurocritical Care  [facility] - Neuro Critical Care       [1]   No current facility-administered medications on file prior to encounter.     Current Outpatient Medications on File Prior to Encounter   Medication Sig Dispense Refill    aspirin (ECOTRIN) 81 MG EC tablet Take 1 tablet (81 mg total) by mouth once daily. 30 tablet 11    cetirizine (ZYRTEC) 10 MG tablet Take 10 mg by mouth nightly as needed.      irbesartan (AVAPRO) 150 MG tablet Take 150 mg by mouth once daily.  0    ticagrelor (BRILINTA) 90 mg tablet Take 1 tablet (90 mg total) by mouth 2 (two) times daily. 60 tablet 11    [DISCONTINUED] albuterol (PROVENTIL/VENTOLIN HFA) 90 mcg/actuation inhaler Inhale 1-2 puffs into the lungs every 4 (four) hours as needed for Wheezing or Shortness of Breath. Rescue 8 g 0    [DISCONTINUED] AMLODIPINE BESYLATE, BULK, MISC Take 5 mg by mouth once daily.      [DISCONTINUED] benzocaine-menthoL 6-10 mg lozenge Take 1 lozenge by mouth every 2 (two) hours as needed. 36 tablet 0    [DISCONTINUED] busPIRone (BUSPAR) 10 MG tablet Take 10 mg by mouth 2 (two) times daily as needed.      [DISCONTINUED] chlordiazepoxide (LIBRIUM) 5 MG capsule Take 1 capsule (5 mg total) by mouth 2 (two) times daily. 60 capsule 5    [DISCONTINUED] chlorhexidine (PERIDEX) 0.12 % solution RINSE AND SPIT WITH 15ML FOR 30 SECONDS BID  0    [DISCONTINUED] hydroCHLOROthiazide (HYDRODIURIL) 12.5 MG Tab Take 12.5 mg by mouth once daily.      [DISCONTINUED] hydrocodone-acetaminophen 5-325mg (NORCO) 5-325 mg per  tablet 325 tablets.  0    [DISCONTINUED] hydrocodone-acetaminophen 7.5-325mg (NORCO) 7.5-325 mg per tablet TK 1 T PO Q 4 HOURS PRF PAIN  0    [DISCONTINUED] lisinopril (PRINIVIL,ZESTRIL) 5 MG tablet Take 5 mg by mouth once daily.      [DISCONTINUED] lithium (ESKALITH) 300 MG capsule 300 mg.  5    [DISCONTINUED] metoprolol succinate (TOPROL-XL) 25 MG 24 hr tablet 25 mg.  3    [DISCONTINUED] mupirocin (BACTROBAN) 2 % ointment SMARTSI Milliliter(s) Topical 1-2 Times Daily      [DISCONTINUED] predniSONE (DELTASONE) 10 MG tablet Take by mouth.     [2]   Social History  Tobacco Use    Smoking status: Never    Smokeless tobacco: Never   Vaping Use    Vaping status: Never Used   Substance Use Topics    Alcohol use: No    Drug use: No

## 2025-07-24 NOTE — SUBJECTIVE & OBJECTIVE
Prescriptions Prior to Admission[1]    Review of patient's allergies indicates:   Allergen Reactions    Codeine Hives    Diphenhydramine Other (See Comments)     chest pains  Other reaction(s): Other (See Comments), Unknown  chest pains  chest pains    Tolmetin Other (See Comments)     stomach pains.  Other reaction(s): Unknown  stomach pains.    Latex Itching and Swelling     Can the patient chew gum without allergic reation? yes  Can the patient blow up a balloon without reaction? No  Is the patient allergic to kiwi, bananas, avocado, or chestnuts? No  Immediate itch or reaction to latex gloves yes  Known allergy to latex (i.e. blood test)? Yes  Does the patient require special undergarments, such as panties/briefs with special waist bandno  Can the patient chew gum without allergic reation? yes  Can the patient blow up a balloon without reaction? No  Is the patient allergic to kiwi, bananas, avocado, or chestnuts? No  Immediate itch or reaction to latex gloves yes  Known allergy to latex (i.e. blood test)? Yes  Does the patient require special undergarments, such as panties/briefs with special waist bandno  Can the patient chew gum without allergic reation? yes  Can the patient blow up a balloon without reaction? No  Is the patient allergic to kiwi, bananas, avocado, or chestnuts? No  Immediate itch or reaction to latex gloves yes  Known allergy to latex (i.e. blood test)? Yes  Does the patient require special undergarments, such as panties/briefs with special waist bandno    Metronidazole Other (See Comments)     Sores on inside of tongue       Past Medical History:   Diagnosis Date    Bipolar disorder     H/O angiography     Feb 2025    Hypertension      Past Surgical History:   Procedure Laterality Date    CHOLECYSTECTOMY  2008    COLON SURGERY  08/17/2018    HYSTERECTOMY  1984    MOUTH SURGERY  07/2017    oopherectomy  2005    TONSILLECTOMY  1984    TOTAL KNEE ARTHROPLASTY       bilateral     Family History   "     Problem Relation (Age of Onset)    Heart disease Father, Brother    Hypertension Mother          Tobacco Use    Smoking status: Never    Smokeless tobacco: Never   Vaping Use    Vaping status: Never Used   Substance and Sexual Activity    Alcohol use: No    Drug use: No    Sexual activity: Never     Partners: Male     Comment: lives with family     Review of Systems  Objective:     Weight: 88.5 kg (195 lb)  Body mass index is 33.47 kg/m².  Vital Signs (Most Recent):  Temp: 97.6 °F (36.4 °C) (07/24/25 1618)  Pulse: (!) 56 (07/24/25 1732)  Resp: (!) 28 (07/24/25 1703)  BP: (!) 145/63 (07/24/25 1703)  SpO2: 99 % (07/24/25 1703) Vital Signs (24h Range):  Temp:  [97.6 °F (36.4 °C)-97.9 °F (36.6 °C)] 97.6 °F (36.4 °C)  Pulse:  [56-69] 56  Resp:  [18-33] 28  SpO2:  [98 %-99 %] 99 %  BP: (130-145)/(63-82) 145/63     Date 07/24/25 0700 - 07/25/25 0659   Shift 9717-9347 0724-0872 7757-3703 24 Hour Total   INTAKE   I.V. 400   400(4.5)   IV Piggyback 250 250  500   Shift Total(mL/kg) 650 250(2.8)  900(10.2)   OUTPUT   Shift Total(mL/kg)       Weight (kg)  88.5 88.5 88.5              Resp Rate Total:  [98 br/min] 98 br/min    Neurosurgery Physical Exam  General: well developed, well nourished, no distress.   Head: normocephalic, atraumatic  Mental Status: Awake, Alert, Oriented x 4  Speech: Clear with content appropriate to conversation  Cranial nerves: face symmetric, CN II-XII grossly intact.   Eyes: pupils equal, round, reactive to light, EOMI.  Sensory: intact to light touch throughout    Motor Strength: Moves all extremities spontaneously with good tone.  Full strength upper and lower extremities. No abnormal movements seen.     Pronator Drift: no drift noted  Finger-to-nose: Intact bilaterally    Right radial access site with clean dry dressing    Significant Labs:  No results for input(s): "GLU", "NA", "K", "CL", "CO2", "BUN", "CREATININE", "CALCIUM", "MG" in the last 48 hours.  No results for input(s): "WBC", "HGB", " ""HCT", "PLT" in the last 48 hours.  No results for input(s): "LABPT", "INR", "APTT" in the last 48 hours.  Microbiology Results (last 7 days)       ** No results found for the last 168 hours. **          All pertinent labs from the last 24 hours have been reviewed.    Significant Diagnostics:  I have reviewed and interpreted all pertinent imaging results/findings within the past 24 hours.       [1]   Medications Prior to Admission   Medication Sig Dispense Refill Last Dose/Taking    aspirin (ECOTRIN) 81 MG EC tablet Take 1 tablet (81 mg total) by mouth once daily. 30 tablet 11 7/24/2025 at  7:30 AM    cetirizine (ZYRTEC) 10 MG tablet Take 10 mg by mouth nightly as needed.   Past Week    irbesartan (AVAPRO) 150 MG tablet Take 150 mg by mouth once daily.  0 7/23/2025 at  6:30 AM    ticagrelor (BRILINTA) 90 mg tablet Take 1 tablet (90 mg total) by mouth 2 (two) times daily. 60 tablet 11 7/24/2025 at  7:30 AM     "

## 2025-07-24 NOTE — H&P
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      ROS:   Hematological: no known coagulopathies  Respiratory: no shortness of breath  Cardiovascular: no chest pain  Gastrointestinal: no abdominal pain  Genito-Urinary: no dysuria  Musculoskeletal: negative  Neurological: no TIA or stroke symptoms     Scheduled Meds:   Current Meds: Current Medications[1]   Continuous Infusions:   PRN Meds:    Allergies:   Review of patient's allergies indicates:   Allergen Reactions    Codeine Hives    Diphenhydramine Other (See Comments)     chest pains  Other reaction(s): Other (See Comments), Unknown  chest pains  chest pains    Tolmetin Other (See Comments)     stomach pains.  Other reaction(s): Unknown  stomach pains.    Latex Itching and Swelling     Can the patient chew gum without allergic reation? yes  Can the patient blow up a balloon without reaction? No  Is the patient allergic to kiwi, bananas, avocado, or chestnuts? No  Immediate itch or reaction to latex gloves yes  Known allergy to latex (i.e. blood test)? Yes  Does the patient require special undergarments, such as panties/briefs with special waist bandno  Can the patient chew gum without allergic reation? yes  Can the patient blow up a balloon without reaction? No  Is the patient allergic to kiwi, bananas, avocado, or chestnuts? No  Immediate itch or reaction to latex gloves yes  Known allergy to latex (i.e. blood test)? Yes  Does the patient require special undergarments, such as panties/briefs with special waist bandno  Can the patient chew gum without allergic reation? yes  Can the patient blow up a balloon without reaction? No  Is the patient allergic to kiwi, bananas, avocado, or chestnuts? No  Immediate itch or reaction to latex gloves yes  Known allergy to latex  (i.e. blood test)? Yes  Does the patient require special undergarments, such as panties/briefs with special waist bandno    Metronidazole Other (See Comments)     Sores on inside of tongue     Sedation Hx: No adverse events.    Labs:              Objective:  Vitals: Blood pressure 134/66, pulse 69, temperature 97.9 °F (36.6 °C), temperature source Temporal, resp. rate (!) 22, SpO2 98%, not currently breastfeeding.     Physical Exam:  General: well developed, well nourished, no distress  Head: normocephalic  Neck: Full ROM.   Neurologic: Alert and oriented  Language: No aphasia  Speech: No dysarthria  Cranial nerves: face symmetric, tongue midline  Eyes: EOMI  Pulmonary: no signs of respiratory distress  Vascular:  No LE edema.   Sensory: intact to light touch throughout  Motor Strength: Moves all extremities spontaneously     ASA: 2  MAL: Pt will be intubated    Plan:  -Plan for cerebral angiogram with basilar tip aneurysm embolization  -Sedation Plan: General anesthesia  -All diagnostics and imaging reviewed  -Pt on ASA 81mg OD and Brilinta 90mg BID with therapeutic PRU from 07/23  -Patient NPO since MN  -Risks & benefits of procedure explained in detail; patient consented and all questions answered  -Further reccs to follow procedure          Nando Garcia MD, MHA  Fellow, NeuroEndovascular Surgery, [facility]  Neurologist, [facility] [place], [place]         [1]   Current Outpatient Medications:     aspirin (ECOTRIN) 81 MG EC tablet, Take 1 tablet (81 mg total) by mouth once daily., Disp: 30 tablet, Rfl: 11    cetirizine (ZYRTEC) 10 MG tablet, Take 10 mg by mouth nightly as needed., Disp: , Rfl:     irbesartan (AVAPRO) 150 MG tablet, Take 150 mg by mouth once daily., Disp: , Rfl: 0    ticagrelor (BRILINTA) 90 mg tablet, Take 1 tablet (90 mg total) by mouth 2 (two) times daily., Disp: 60 tablet, Rfl: 11

## 2025-07-24 NOTE — PLAN OF CARE
Cerebral angiogram with coil/stent procedure completed.Anesthesia remains on the case. Please see anesthesia flowsheet for airway, cardiac and VS monitoring, as well as sedation/medication administration and updates. Right radial artery with TR band in place. 11cc air instilled at 1533, can begin to remove air in two hours, 1733. Procedure site clean, dry, and intact; no bleeding or hematoma noted. Patient to be transferred to [facility].Report called to Karen MACHUCA.

## 2025-07-24 NOTE — PROCEDURES
Interventional Neuroradiology Post-Procedure Note    Pre Op Diagnosis: Basilar Tip Aneurysm    Post Op Diagnosis: Same    Procedure: Diagnostic cerebral angiogram and T-Stent Assisted Coiling    Procedure performed by: Kalpana Ruvalcaba MD; Jose FREEMAN, Nando    Written Informed Consent Obtained: Yes    Specimen Removed: NO    Estimated Blood Loss: Minimal    Procedure report:   A 6F slender sheath was placed into the right radial artery and a 5F Dick 2 catheter was advanced over 0.035 glidewire into the aortic arch.  The right vertebral artery angiography of the brain was performed after injection into each of these vessels.     Preliminary interpretation: Stable basilar tip aneurysm that was successfully treated with T-stent assisted coiling.     A right radial artery angiogram was performed, the sheath removed and hemostasis achieved using transradial bend with closing pressure of 11cc.  No hematoma was present at the time of hemostasis.    The patient tolerated the procedure well.     Benchmark (90cm), 5F Berenstein select  Coaxial: SL-10 with synchro x 2  [place]: 4mm x 24mm (Right P1 --> Basilar), 3mm x 15mm (Left P2 --> Left P1)  Coils: Axium Prime Frame 3D (3.5mm x 6cm)            Axium Extra Soft (2mm x 2cm, 2mm x 1cm, 1.5mm x 4cm, 1.5mm x 3cm)     Plan:  -To recovery for 2h  -Avoid checking blood pressure in right arm for 24h  -Avoid carrying heavy weights with right arm > 10 lbs x 24 hrs   -Limit movement in the right arm for 6 hours post procedure  -Cont. ASA 81mg Once daily and Brilinta 90mg Twice Daily  -Air Removal Instructions from TR Band: Two hours after TR Band is applied deflate 3cc of air from cuff. If no bleeding occurs from the site, deflate 3cc of air from the TR Band every 10 minutes until all 11cc of air has been removed. If site free of bleeding or hematoma after 10 minutes remove TR Band and apply sterile dressing to site. If bleeding occurs when 3cc of air is removed re-inflate with  3cc of air. Wait 30 minutes then restart releasing 3cc of air every 10 minutes until al 11cc of air has been removed.  If needed, place wrist on arm board to restrict movement.  -After Discharge: After radial catheterization, pt will need to rest, care for the puncture site, and gradually resume activities. Rest quietly for the remainder of the day, avoid strenuous activity for 2-3 days. Driving and making important decisions should be avoided for at least 24 hours, especially if you took medication to relax.  Limit lifting weight upto 2 pounds for 48 hours, then no more than 10 pounds for two weeks with your right hand. You can usually return to light work in 24h unless it requires you to do heavy lifting with your hands. If you have to perform weight bearing using right hand at work, avoid that for upto 48h and then gradually resume the activity. You can shower after 24 hours, but avoid baths or swimming for at least a week. Keep puncture site clean and dry. Clean the site gently with soap and water when showering, but avoid scrubbing. Keep the site covered with a bandage for 24h.  Check for signs of infection, such as redness, swelling, pain, or drainage, and contact your doctor if they worsen.                         Nando Garcia MD, MHA  Fellow, NeuroEndovascular Surgery, [facility]  Neurologist, [facility] [place], [place]

## 2025-07-24 NOTE — HPI
Ms. 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     Patient was followed by her neurologist in [place]. He noted in October of 2024 that Ms. Ochoa's basilar tip aneurysm had increased slightly in size from imaging in 2017. She was referred to Dr. Ibarra for possible intervention at that time.     Patient is now s/p stent-assisted coiling of the basilar tip aneurysm. She will be monitored in ICU overnight.

## 2025-07-24 NOTE — PLAN OF CARE
"[facility] Care Plan  POC reviewed with Carrol Molina and family at 1800. Patient verbalized understanding. Questions and concerns addressed. No acute events today. Pt progressing toward goals. Will continue to monitor. See below and flowsheets for full assessment and VS info.       - R radial TR band in  place, started deflating 3cc at a time   - Neuro exam intact      Is this a stroke patient? yes- Stroke booklet reviewed with patient and is at bedside.   Care Plan Individualization:     Neuro:  Valdo Coma Scale  Best Eye Response: 4-->(E4) spontaneous  Best Motor Response: 6-->(M6) obeys commands  Best Verbal Response: 5-->(V5) oriented  Valdo Coma Scale Score: 15  Assessment Qualifiers: Patient not sedated/intubated  Pupil PERRLA: yes     24 hr Temp:  [97.6 °F (36.4 °C)-97.9 °F (36.6 °C)]     CV:   Rhythm: normal sinus rhythm  BP goals:   MAP > 65    Resp:           Plan: N/A    GI/:     Diet/Nutrition Received: NPO  Last Bowel Movement: 07/24/25  Voiding Characteristics: external catheter    Intake/Output Summary (Last 24 hours) at 7/24/2025 1823  Last data filed at 7/24/2025 1545  Gross per 24 hour   Intake 900 ml   Output --   Net 900 ml          Labs/Accuchecks:  No results for input(s): "WBC", "RBC", "HGB", "HCT", "PLT" in the last 168 hours.   Recent Labs   Lab 07/18/25  0921      K 4.0   CO2 21*   *   BUN 15   CREATININE 1.1    No results for input(s): "PROTIME", "INR", "APTT", "HEPANTIXA" in the last 168 hours. No results for input(s): "CPK", "CPKMB", "TROPONINI", "MB" in the last 168 hours.    Electrolytes: No replacement orders  Accuchecks: none    Gtts:   0.9% NaCl   Intravenous Continuous   Stopped at 07/24/25 1602       LDA/Wounds:    Farhad Risk Assessment  Sensory Perception: 3-->slightly limited  Moisture: 4-->rarely moist  Activity: 3-->walks occasionally  Mobility: 3-->slightly limited  Nutrition: 3-->adequate  Friction and Shear: 3-->no apparent problem  Farhad Score: " 19    Is your amanda score 12 or less? no

## 2025-07-24 NOTE — ANESTHESIA PROCEDURE NOTES
Intubation    Date/Time: 7/24/2025 12:33 PM    Performed by: Matilda Kamara CRNA  Authorized by: Naga Esqueda MD    Intubation:     Induction:  Intravenous    Intubated:  Postinduction    Mask Ventilation:  Easy mask    Attempts:  1    Attempted By:  CRNA    Method of Intubation:  Video laryngoscopy    Blade:  Christine 3    Laryngeal View Grade: Grade I - full view of cords      Difficult Airway Encountered?: No      Complications:  None    Airway Device:  Oral endotracheal tube    Airway Device Size:  7.5    Style/Cuff Inflation:  Cuffed (inflated to minimal occlusive pressure)    Tube secured:  22    Secured at:  The lips    Placement Verified By:  Capnometry (auscultation)    Complicating Factors:  None    Findings Post-Intubation:  BS equal bilateral and atraumatic/condition of teeth unchanged  Notes:      Head and neck neutral

## 2025-07-24 NOTE — HPI
Carrol Molina is a 64 y.o. 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 She has been followed in clinic by Dr. Ibarra for a basilar tip aneurysm measuring 3.5 mm in height, 4.08 mm in width and 4.12 mm neck diameter.  After undergoing DSA in February, she was recommended to undergo treatment and opted to proceed. She tolerated the procedure without complication by radial access.  She is admitted to neuro ICU for close postprocedure monitoring.  She is doing well postoperatively.  She denies headaches, vision changes, nausea, vomiting, focal weakness, or sensory dysfunction.

## 2025-07-24 NOTE — ASSESSMENT & PLAN NOTE
Carrol Molina is a 64 y.o. 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 She has been followed in clinic by Dr. Ibarra for a basilar tip aneurysm measuring 3.5 mm in height, 4.08 mm in width and 4.12 mm neck diameter.  After undergoing DSA in February, she was recommended to undergo treatment and opted to proceed. She tolerated the procedure without complication by radial access.      - Admitted to [facility] with q1 hour neuro checks  - Post procedure care per Neuro IR  - DAPT per Neuro IR  - Notify NSGY with any decline on exam     Case and plan discussed with attending neurosurgeon Dr. Ibarra

## 2025-07-25 ENCOUNTER — PATIENT MESSAGE (OUTPATIENT)
Dept: ADMINISTRATIVE | Facility: OTHER | Age: 65
End: 2025-07-25
Payer: MEDICARE

## 2025-07-25 VITALS
DIASTOLIC BLOOD PRESSURE: 58 MMHG | RESPIRATION RATE: 20 BRPM | HEIGHT: 64 IN | OXYGEN SATURATION: 99 % | WEIGHT: 195 LBS | BODY MASS INDEX: 33.29 KG/M2 | HEART RATE: 60 BPM | SYSTOLIC BLOOD PRESSURE: 113 MMHG | TEMPERATURE: 98 F

## 2025-07-25 LAB
ABSOLUTE EOSINOPHIL (OHS): 0 K/UL
ABSOLUTE MONOCYTE (OHS): 0.29 K/UL (ref 0.3–1)
ABSOLUTE NEUTROPHIL COUNT (OHS): 5.64 K/UL (ref 1.8–7.7)
ALBUMIN SERPL BCP-MCNC: 3.2 G/DL (ref 3.5–5.2)
ALP SERPL-CCNC: 62 UNIT/L (ref 40–150)
ALT SERPL W/O P-5'-P-CCNC: 39 UNIT/L (ref 0–55)
ANION GAP (OHS): 7 MMOL/L (ref 8–16)
AST SERPL-CCNC: 36 UNIT/L (ref 0–50)
BASOPHILS # BLD AUTO: 0.01 K/UL
BASOPHILS NFR BLD AUTO: 0.1 %
BILIRUB SERPL-MCNC: 0.3 MG/DL (ref 0.1–1)
BUN SERPL-MCNC: 18 MG/DL (ref 8–23)
CALCIUM SERPL-MCNC: 8.6 MG/DL (ref 8.7–10.5)
CHLORIDE SERPL-SCNC: 115 MMOL/L (ref 95–110)
CO2 SERPL-SCNC: 19 MMOL/L (ref 23–29)
CREAT SERPL-MCNC: 1.1 MG/DL (ref 0.5–1.4)
ERYTHROCYTE [DISTWIDTH] IN BLOOD BY AUTOMATED COUNT: 13.2 % (ref 11.5–14.5)
GFR SERPLBLD CREATININE-BSD FMLA CKD-EPI: 56 ML/MIN/1.73/M2
GLUCOSE SERPL-MCNC: 148 MG/DL (ref 70–110)
HCT VFR BLD AUTO: 32.8 % (ref 37–48.5)
HGB BLD-MCNC: 10.8 GM/DL (ref 12–16)
IMM GRANULOCYTES # BLD AUTO: 0.01 K/UL (ref 0–0.04)
IMM GRANULOCYTES NFR BLD AUTO: 0.1 % (ref 0–0.5)
LYMPHOCYTES # BLD AUTO: 0.78 K/UL (ref 1–4.8)
MAGNESIUM SERPL-MCNC: 1.7 MG/DL (ref 1.6–2.6)
MCH RBC QN AUTO: 28.5 PG (ref 27–31)
MCHC RBC AUTO-ENTMCNC: 32.9 G/DL (ref 32–36)
MCV RBC AUTO: 87 FL (ref 82–98)
NUCLEATED RBC (/100WBC) (OHS): 0 /100 WBC
PHOSPHATE SERPL-MCNC: 2.1 MG/DL (ref 2.7–4.5)
PLATELET # BLD AUTO: 186 K/UL (ref 150–450)
PMV BLD AUTO: 10.4 FL (ref 9.2–12.9)
POTASSIUM SERPL-SCNC: 4.1 MMOL/L (ref 3.5–5.1)
PROT SERPL-MCNC: 5.9 GM/DL (ref 6–8.4)
RBC # BLD AUTO: 3.79 M/UL (ref 4–5.4)
RELATIVE EOSINOPHIL (OHS): 0 %
RELATIVE LYMPHOCYTE (OHS): 11.6 % (ref 18–48)
RELATIVE MONOCYTE (OHS): 4.3 % (ref 4–15)
RELATIVE NEUTROPHIL (OHS): 83.9 % (ref 38–73)
SODIUM SERPL-SCNC: 141 MMOL/L (ref 136–145)
WBC # BLD AUTO: 6.73 K/UL (ref 3.9–12.7)

## 2025-07-25 PROCEDURE — 25000003 PHARM REV CODE 250: Performed by: PHYSICIAN ASSISTANT

## 2025-07-25 PROCEDURE — 97535 SELF CARE MNGMENT TRAINING: CPT

## 2025-07-25 PROCEDURE — 83735 ASSAY OF MAGNESIUM: CPT

## 2025-07-25 PROCEDURE — 85025 COMPLETE CBC W/AUTO DIFF WBC: CPT | Performed by: PHYSICIAN ASSISTANT

## 2025-07-25 PROCEDURE — 99233 SBSQ HOSP IP/OBS HIGH 50: CPT | Mod: ,,, | Performed by: PHYSICIAN ASSISTANT

## 2025-07-25 PROCEDURE — 97165 OT EVAL LOW COMPLEX 30 MIN: CPT

## 2025-07-25 PROCEDURE — 84100 ASSAY OF PHOSPHORUS: CPT

## 2025-07-25 PROCEDURE — 80053 COMPREHEN METABOLIC PANEL: CPT | Performed by: PHYSICIAN ASSISTANT

## 2025-07-25 PROCEDURE — 25000003 PHARM REV CODE 250

## 2025-07-25 RX ORDER — SODIUM,POTASSIUM PHOSPHATES 280-250MG
2 POWDER IN PACKET (EA) ORAL
Status: DISCONTINUED | OUTPATIENT
Start: 2025-07-25 | End: 2025-07-25 | Stop reason: HOSPADM

## 2025-07-25 RX ORDER — ACETAMINOPHEN 325 MG/1
650 TABLET ORAL EVERY 6 HOURS PRN
Qty: 30 TABLET | Refills: 0 | Status: SHIPPED | OUTPATIENT
Start: 2025-07-25

## 2025-07-25 RX ORDER — LANOLIN ALCOHOL/MO/W.PET/CERES
800 CREAM (GRAM) TOPICAL
Status: DISCONTINUED | OUTPATIENT
Start: 2025-07-25 | End: 2025-07-25 | Stop reason: HOSPADM

## 2025-07-25 RX ORDER — ALLOPURINOL 100 MG/1
100 TABLET ORAL DAILY
COMMUNITY
Start: 2025-03-28

## 2025-07-25 RX ADMIN — POTASSIUM & SODIUM PHOSPHATES POWDER PACK 280-160-250 MG 2 PACKET: 280-160-250 PACK at 08:07

## 2025-07-25 RX ADMIN — LOSARTAN POTASSIUM 50 MG: 50 TABLET, FILM COATED ORAL at 08:07

## 2025-07-25 RX ADMIN — TICAGRELOR 90 MG: 90 TABLET ORAL at 08:07

## 2025-07-25 RX ADMIN — Medication 800 MG: at 04:07

## 2025-07-25 RX ADMIN — POTASSIUM & SODIUM PHOSPHATES POWDER PACK 280-160-250 MG 2 PACKET: 280-160-250 PACK at 04:07

## 2025-07-25 RX ADMIN — ACETAMINOPHEN 650 MG: 325 TABLET ORAL at 08:07

## 2025-07-25 RX ADMIN — MUPIROCIN: 20 OINTMENT TOPICAL at 08:07

## 2025-07-25 RX ADMIN — ASPIRIN 81 MG: 81 TABLET, COATED ORAL at 08:07

## 2025-07-25 NOTE — HOSPITAL COURSE
07/25/2025: Neurologically stable. Feels unstable on feet. Evaluated by OT who recommended rolling walker. Will have this delivered to her home and referral sent for  OT and PT. OK to discharge home on ASA and brilinta.

## 2025-07-25 NOTE — PLAN OF CARE
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              [facility] - Neuro Critical Care       Encounter Date: 2025  4:30 PM    IP Admit Date: 25   Discharge Date: 2025  3:44 PM   Care Everywhere:  [id number]    MRN: [id number]   Guarantor: YULIET MOLINA   Contact Serial #: [id number]   Hospital Account: [id number]       ENCOUNTER          Patient Class: Inpatient   Unit: [facility]*   Hospital Service: Neuro Critical Care   Bed: 9077 A   Admitting Provider: Christiano Olivas Do   Referring Physician: Kalpana Ibarra   Attending Provider:     Adm Diagnosis: Cerebral aneurysm, nonru*   PATIENT                 Name: FREDMIKE YULIET CUADRA : 1960 (64 yrs)   Address: [address] [place] [address] Sex: Female   City: [place] [address] SSN:    Primary Care Provider: Dillan Resendez MD         Primary Phone: [phone]   EMERGENCY CONTACT   Contact Name Legal Guardian? Relationship to Patient Home Phone Work Phone Mobile Phone   1. Tanmay Molina  2. *No Contact Specified*      Spouse    [phone]   GUARANTOR                  Guarantor: YULIET MOLINA       : 1960   Address: [address] [place] [address]    [place] [address] Guarantor  Type: P/F   Relation to Patient: Self         Home Phone: [phone]   Guarantor ID: [id number]         Work Phone:     GUARANTOR EMPLOYER     Employer: [employer]           Status: FULL TIME   COVERAGE        PRIMARY INSURANCE   Payor: [employer]* Plan: CIGNA MEDICARE ADVANTAGE   Group Number: [id number] Insurance Type: INDEMNITY   Subscriber Name: YULIET MOLINA Subscriber : 1960   Subscriber ID: [id number] " Insurance Address: [facility] [id number]  [place] [address]   Pat. Rel. to Subscriber: Self       SECONDARY INSURANCE   Payor:   Plan:     Group Number:   Insurance Type:     Subscriber Name:   Subscriber :     Subscriber ID:   Insurance Address:     Pat. Rel. to Subscriber:            Comments  CommentHospital Problem List      Noted    Aneurysm of basilar artery 2018   Hypertension 2017     Vitals (last 3 days) before discharge    Date/Time Temp Pulse Resp BP SpO2   25 1433 -- 61 28 Abnormal  109/58 Abnormal  97 %   25 1333 -- 60 19 114/57 Abnormal  98 %   25 1233 -- 60 32 Abnormal  117/58 Abnormal  96 %   25 1133 -- 73 30 Abnormal  129/59 Abnormal  96 %   25 1101 98.2 °F (36.8 °C) 68 27 Abnormal  129/59 Abnormal  96 %   25 1033 -- 66 34 Abnormal  138/62 96 %   25 0933 -- 66 31 Abnormal  149/66 Abnormal  98 %   25 0833 -- 62 25 Abnormal  140/62 Abnormal  98 %   25 0733 98 °F (36.7 °C) 57 Abnormal  12 132/63 97 %   25 0633 -- 55 Abnormal  23 Abnormal  128/63 97 %   25 0533 -- 57 Abnormal  26 Abnormal  113/58 Abnormal  97 %   25 0433 -- 60 16 111/55 Abnormal  96 %   25 0333 98.2 °F (36.8 °C) 57 Abnormal  17 110/54 Abnormal  96 %   25 0233 -- 59 Abnormal  21 Abnormal  117/58 Abnormal  96 %   25 0133 -- 63 21 Abnormal  109/56 Abnormal  96 %   25 0033 -- 62 23 Abnormal  108/58 Abnormal  96 %   25 0003 -- 62 26 Abnormal  109/57 Abnormal  96 %   25 2333 -- 62 24 Abnormal  112/56 Abnormal  96 %   25 2303 98.4 °F (36.9 °C) 63 24 Abnormal  113/53 Abnormal  97 %   25 2233 -- 64 24 Abnormal  123/58 Abnormal  98 %   07/24/25 2203 -- 67 20 123/56 Abnormal  97 %   25 -- 65 20 111/54 Abnormal  96 %   25 -- 68 30 Abnormal  133/60 98 %   25 -- 79 30 Abnormal  106/55 Abnormal  97 %   25 -- 77 25 Abnormal  137/63 98 %   25 -- 69 26 Abnormal  145/63 Abnormal   98 %   07/24/25 1903 98 °F (36.7 °C) 65 33 Abnormal  145/65 Abnormal  99 %   07/24/25 1833 -- 64 22 Abnormal  145/75 Abnormal  100 %   07/24/25 1803 -- 63 25 Abnormal  142/65 Abnormal  99 %   07/24/25 1733 -- 60 25 Abnormal  141/66 Abnormal  100 %   07/24/25 17:32:59 -- 56 Abnormal  -- -- --   07/24/25 1703 -- 58 Abnormal  28 Abnormal  145/63 Abnormal  99 %   07/24/25 1648 -- 62 33 Abnormal  138/82 99 %   07/24/25 1633 -- 62 32 Abnormal  145/63 Abnormal  99 %   07/24/25 1618 97.6 °F (36.4 °C) 59 Abnormal  18 130/77 --   07/24/25 1029 97.9 °F (36.6 °C) 69 22 Abnormal  134/66 98 %     All Component Based Labs     07/25/25  0209  07/24/25  1832  07/23/25  1000     Platelet Response Plavix     22 Low      Platelet Response Aspirin     377 Low      Albumin 3.2 Low   3.5       ALP 62  69       ALT 39  42       Anion Gap 7 Low   8       AST 36  52 High        Baso # 0.01  0.01       Basophil % 0.1  0.1       BILIRUBIN TOTAL 0.3  0.5       BUN 18  15       Calcium 8.6 Low   8.7       Chloride 115 High   116 High        CO2 19 Low   19 Low        Creatinine 1.1  1.1       eGFR 56 Low   56 Low        Eos # 0.00  0.00       Eos % 0.0  0.0       Glucose 148 High   143 High        Gran # (ANC) 5.64  6.44       Hematocrit 32.8 Low   38.1       Hemoglobin 10.8 Low   12.4       Immature Grans (Abs) 0.01  0.01       Immature Granulocytes 0.1  0.1       Lymph # 0.78 Low   0.91 Low        Lymph % 11.6 Low   12.1 Low        Magnesium 1.7         MCH 28.5  28.2       MCHC 32.9  32.5       MCV 87  87       Mono # 0.29 Low   0.12 Low        Mono % 4.3  1.6 Low        MPV 10.4  10.4       Neut % 83.9 High   86.1 High        nRBC 0  0       Phosphorus Level 2.1 Low          Platelet Count 186  196       Potassium 4.1  4.3       PROTEIN TOTAL 5.9 Low   6.4       RBC 3.79 Low   4.40       RDW 13.2  13.2       Sodium 141  143       WBC 6.73  7.49              - Comment        H & P  Notes from 06/25/25 through 07/25/25  H&P by Sara Mazariegos  BOLIVAR BARRIENTOS at 7/24/2025  5:26 PM    Author: Sara Mazariegos PA-C Service: Neuro Critical Care Author Type: Physician Assistant   Filed: 7/24/2025  6:29 PM Creation Time: 7/24/2025  6:26 PM Status: Signed   : Sara Mazariegos PA-C (Physician Assistant) Cosigner: Aldo Walker MD at 7/25/2025 12:38 PM   [facility] Neuro Critical Care  Neurocritical Care  History & Physical     Admit Date: 7/24/2025  Service Date: 07/24/2025  Length of Stay: 0     Subjective:      Chief Complaint: Aneurysm of basilar artery     History of Present Illness: Ms. 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      Patient was followed by her neurologist in [place]. He noted in October of 2024 that Ms. Ochoa's basilar tip aneurysm had increased slightly in size from imaging in 2017. She was referred to Dr. Ibarra for possible intervention at that time.      Patient is now s/p stent-assisted coiling of the basilar tip aneurysm. She will be monitored in ICU overnight.           Past Medical History:   Diagnosis Date    Bipolar disorder      H/O angiography       Feb 2025    Hypertension              Past Surgical History:   Procedure Laterality Date    CHOLECYSTECTOMY   2008    COLON SURGERY   08/17/2018    HYSTERECTOMY   1984    MOUTH SURGERY   07/2017    oopherectomy   2005    TONSILLECTOMY   1984    TOTAL KNEE ARTHROPLASTY          bilateral         [Medications Ordered Prior to Encounter]    [Medications Ordered Prior to Encounter]  No current facility-administered medications on file prior to encounter.             Current Outpatient Medications on File Prior to Encounter   Medication Sig Dispense Refill    aspirin (ECOTRIN) 81 MG EC tablet Take 1 tablet (81 mg total) by mouth once daily. 30 tablet 11    cetirizine (ZYRTEC) 10 MG tablet Take 10 mg by  mouth nightly as needed.        irbesartan (AVAPRO) 150 MG tablet Take 150 mg by mouth once daily.   0    ticagrelor (BRILINTA) 90 mg tablet Take 1 tablet (90 mg total) by mouth 2 (two) times daily. 60 tablet 11    [DISCONTINUED] albuterol (PROVENTIL/VENTOLIN HFA) 90 mcg/actuation inhaler Inhale 1-2 puffs into the lungs every 4 (four) hours as needed for Wheezing or Shortness of Breath. Rescue 8 g 0    [DISCONTINUED] AMLODIPINE BESYLATE, BULK, MISC Take 5 mg by mouth once daily.        [DISCONTINUED] benzocaine-menthoL 6-10 mg lozenge Take 1 lozenge by mouth every 2 (two) hours as needed. 36 tablet 0    [DISCONTINUED] busPIRone (BUSPAR) 10 MG tablet Take 10 mg by mouth 2 (two) times daily as needed.        [DISCONTINUED] chlordiazepoxide (LIBRIUM) 5 MG capsule Take 1 capsule (5 mg total) by mouth 2 (two) times daily. 60 capsule 5    [DISCONTINUED] chlorhexidine (PERIDEX) 0.12 % solution RINSE AND SPIT WITH 15ML FOR 30 SECONDS BID   0    [DISCONTINUED] hydroCHLOROthiazide (HYDRODIURIL) 12.5 MG Tab Take 12.5 mg by mouth once daily.        [DISCONTINUED] hydrocodone-acetaminophen 5-325mg (NORCO) 5-325 mg per tablet 325 tablets.   0    [DISCONTINUED] hydrocodone-acetaminophen 7.5-325mg (NORCO) 7.5-325 mg per tablet TK 1 T PO Q 4 HOURS PRF PAIN   0    [DISCONTINUED] lisinopril (PRINIVIL,ZESTRIL) 5 MG tablet Take 5 mg by mouth once daily.        [DISCONTINUED] lithium (ESKALITH) 300 MG capsule 300 mg.   5    [DISCONTINUED] metoprolol succinate (TOPROL-XL) 25 MG 24 hr tablet 25 mg.   3    [DISCONTINUED] mupirocin (BACTROBAN) 2 % ointment SMARTSI Milliliter(s) Topical 1-2 Times Daily        [DISCONTINUED] predniSONE (DELTASONE) 10 MG tablet Take by mouth.         Allergies: Codeine, Diphenhydramine, Tolmetin, Latex, and Metronidazole            Family History   Problem Relation Name Age of Onset    Hypertension Mother        Heart disease Father        Heart disease Brother             [Social History]     [Social  History]       Tobacco Use    Smoking status: Never    Smokeless tobacco: Never   Vaping Use    Vaping status: Never Used   Substance Use Topics    Alcohol use: No    Drug use: No     Review of Systems: Denies headache, weakness, numbness. Reports shivering and feeling cold.      Vitals:   Temp: 97.6 °F (36.4 °C)  Pulse: 63  Rhythm: normal sinus rhythm  BP: (!) 142/65  MAP (mmHg): 94  Resp: (!) 25  SpO2: 99 %     Temp  Min: 97.6 °F (36.4 °C)  Max: 97.9 °F (36.6 °C)  Pulse  Min: 56  Max: 69  BP  Min: 130/77  Max: 145/63  MAP (mmHg)  Min: 91  Max: 105  Resp  Min: 18  Max: 33  SpO2  Min: 98 %  Max: 100 %     No intake/output data recorded.             Examination:   Constitutional: Well-nourished and -developed. No apparent distress.   Eyes: Conjunctiva clear, anicteric. Lids no lesions.  Head/Ears/Nose/Mouth/Throat/Neck: Moist mucous membranes. External ears, nose atraumatic.   Cardiovascular: Regular rhythm. R hand warm, well perfused.   Respiratory: Comfortable respirations.      Neurologic:  -GCS [id number]  -Alert. Oriented to person, place, and time. Speech fluent. Follows commands.  -Cranial nerves II-XII intact, particularly PERRL, EOMI, facial expression and sensation symmetric, tongue midline, shoulder shrug symmetric   -Motor 5/5 in all four extremities   -Sensation intact to light touch     Today I independently reviewed pertinent medications, imaging, laboratory results, notably:   - home medications reviewed  - platelet response assays reviewed        Assessment/Plan:      Neuro  * Aneurysm of basilar artery  65 y/o with basilar tip aneurysm initially incidentally found in 2017 during orthostatic tremor w/u. Aneurysm noted to have grown on recent MRA in 2024, and patient referred for intervention. She is now s/p stent-assisted coiling of the basilar tip aneurysm.      - Admit to NCC   - Hourly neuro checks  - Continue DAPT with ASA and ticagrelor.   - patient reports subjective intolerance of ticagrelor, that  she does not like the way it makes her feel. Patient reports she was previously on Plavix and asa, but the Plavix was ineffective. platelet response assay on 7/18 showed 212 PRU  - Hourly neurovascular checks  - SBP goal <140, PRNs available      Cardiac/Vascular  Hypertension  SBP goal <140   - Home antihypertensives reordered  - PRNs available             The patient is being Prophylaxed for:  Venous Thromboembolism with: Mechanical  Stress Ulcer with: None  Ventilator Pneumonia with: not applicable     Activity Orders               Progressive Mobility Protocol (mobilize patient to their highest level of functioning at least twice daily) starting at 07/24 2000     Progressive Mobility Protocol (mobilize patient to their highest level of functioning at least twice daily) starting at 07/24 2000     Diet Adult Regular: Regular starting at 07/24 1750             Full Code     Critical care time spent on the evaluation and treatment of severe organ dysfunction, review of pertinent labs and imaging studies, discussions with consulting providers and discussions with patient/family: 30 minutes.        Sara Mazariegos PA-C  Neurocritical Care  [facility] - Neuro Critical Care      Electronically signed by Aldo Walker MD on 7/25/2025 12:38 PM     H&P by Nando Garcia MD at 7/24/2025 10:00 AM    Author: Nando Garcia MD Service: Neuro Interventional Author Type: Physician   Filed: 7/24/2025 11:07 AM Creation Time: 7/24/2025 11:02 AM Status: Cosign Needed   : Nando Garcia MD (Physician)   Related Notes: Original Note by Nando Garcia MD (Physician) filed at 7/24/2025 11:06 AM   Cosign Required: Yes   Interventional Neuroradiology Pre-procedure Note     Procedure: Diagnostic cerebral angiogram and intervention     History of Present Illness:  Carrol Molina is a 64 y.o. female with basilar tip aneurysm presents for aneurysm treatment.        ROS:   Hematological: no known  coagulopathies  Respiratory: no shortness of breath  Cardiovascular: no chest pain  Gastrointestinal: no abdominal pain  Genito-Urinary: no dysuria  Musculoskeletal: negative  Neurological: no TIA or stroke symptoms      Scheduled Meds:   Current Meds: [Current Medications]     [Current Medications]       Current Outpatient Medications:     aspirin (ECOTRIN) 81 MG EC tablet, Take 1 tablet (81 mg total) by mouth once daily., Disp: 30 tablet, Rfl: 11    cetirizine (ZYRTEC) 10 MG tablet, Take 10 mg by mouth nightly as needed., Disp: , Rfl:     irbesartan (AVAPRO) 150 MG tablet, Take 150 mg by mouth once daily., Disp: , Rfl: 0    ticagrelor (BRILINTA) 90 mg tablet, Take 1 tablet (90 mg total) by mouth 2 (two) times daily., Disp: 60 tablet, Rfl: 11    Continuous Infusions:   PRN Meds:     Allergies:         Review of patient's allergies indicates:   Allergen Reactions    Codeine Hives    Diphenhydramine Other (See Comments)       chest pains  Other reaction(s): Other (See Comments), Unknown  chest pains  chest pains    Tolmetin Other (See Comments)       stomach pains.  Other reaction(s): Unknown  stomach pains.    Latex Itching and Swelling       Can the patient chew gum without allergic reation? yes  Can the patient blow up a balloon without reaction? No  Is the patient allergic to kiwi, bananas, avocado, or chestnuts? No  Immediate itch or reaction to latex gloves yes  Known allergy to latex (i.e. blood test)? Yes  Does the patient require special undergarments, such as panties/briefs with special waist bandno  Can the patient chew gum without allergic reation? yes  Can the patient blow up a balloon without reaction? No  Is the patient allergic to kiwi, bananas, avocado, or chestnuts? No  Immediate itch or reaction to latex gloves yes  Known allergy to latex (i.e. blood test)? Yes  Does the patient require special undergarments, such as panties/briefs with special waist bandno  Can the patient chew gum without  allergic reation? yes  Can the patient blow up a balloon without reaction? No  Is the patient allergic to kiwi, bananas, avocado, or chestnuts? No  Immediate itch or reaction to latex gloves yes  Known allergy to latex (i.e. blood test)? Yes  Does the patient require special undergarments, such as panties/briefs with special waist bandno    Metronidazole Other (See Comments)       Sores on inside of tongue      Sedation Hx: No adverse events.     Labs:         Objective:  Vitals: Blood pressure 134/66, pulse 69, temperature 97.9 °F (36.6 °C), temperature source Temporal, resp. rate (!) 22, SpO2 98%, not currently breastfeeding.      Physical Exam:  General: well developed, well nourished, no distress  Head: normocephalic  Neck: Full ROM.   Neurologic: Alert and oriented  Language: No aphasia  Speech: No dysarthria  Cranial nerves: face symmetric, tongue midline  Eyes: EOMI  Pulmonary: no signs of respiratory distress  Vascular:  No LE edema.   Sensory: intact to light touch throughout  Motor Strength: Moves all extremities spontaneously      ASA: 2  MAL: Pt will be intubated     Plan:  -Plan for cerebral angiogram with basilar tip aneurysm embolization  -Sedation Plan: General anesthesia  -All diagnostics and imaging reviewed  -Pt on ASA 81mg OD and Brilinta 90mg BID with therapeutic PRU from 07/23  -Patient NPO since MN  -Risks & benefits of procedure explained in detail; patient consented and all questions answered  -Further reccs to follow procedure              Nando Garcia MD, MHA  Fellow, NeuroEndovascular Surgery, [facility]  Neurologist, [facility], [place]             Electronically signed by Nando Garcia MD on 7/24/2025 11:07 AM     Physician Progress Notes  Notes for yesterday and today  No notes of this type exist for this encounter.   Lines   ReportNone

## 2025-07-25 NOTE — ANESTHESIA POSTPROCEDURE EVALUATION
Anesthesia Post Evaluation    Patient: Carrol Molina    Procedure(s) Performed: * No procedures listed *    Final Anesthesia Type: general      Patient location during evaluation: ICU  Patient participation: Yes- Able to Participate  Level of consciousness: awake and alert and awake  Post-procedure vital signs: reviewed and stable  Pain management: adequate  Airway patency: patent    PONV status at discharge: No PONV  Anesthetic complications: no      Cardiovascular status: blood pressure returned to baseline  Respiratory status: unassisted and spontaneous ventilation  Hydration status: euvolemic  Follow-up not needed.              Vitals Value Taken Time   /63 07/25/25 07:32   Temp 36.8 °C (98.2 °F) 07/25/25 03:33   Pulse 62 07/25/25 07:35   Resp 23 07/25/25 07:35   SpO2 98 % 07/25/25 07:35   Vitals shown include unfiled device data.      No case tracking events are documented in the log.      Pain/Shelley Score: Pain Rating Prior to Med Admin: 5 (7/24/2025 10:20 PM)  Pain Rating Post Med Admin: 2 (7/24/2025 11:20 PM)

## 2025-07-25 NOTE — PLAN OF CARE
"[facility] Care Plan    POC reviewed with Carrol Molina and family at 0300. Patient and Family verbalized understanding. Questions and concerns addressed. No acute events today. Pt progressing toward goals. Will continue to monitor. See below and flowsheets for full assessment and VS info.       - PRN tylenol x1   - Phos and mag replaced       Is this a stroke patient? no    Neuro:  [place] Coma Scale  Best Eye Response: 4-->(E4) spontaneous  Best Motor Response: 6-->(M6) obeys commands  Best Verbal Response: 5-->(V5) oriented  [place] Coma Scale Score: 15  Assessment Qualifiers: No eye obstruction present, Patient not sedated/intubated  Pupil PERRLA: yes     24 hr Temp:  [97.6 °F (36.4 °C)-98.4 °F (36.9 °C)]     CV:   Rhythm: normal sinus rhythm  BP goals:   SBP < 140  MAP > 65    Resp:           Plan: N/A    GI/:     Diet/Nutrition Received: regular  Last Bowel Movement: 07/24/25  Voiding Characteristics: external catheter    Intake/Output Summary (Last 24 hours) at 7/25/2025 0658  Last data filed at 7/25/2025 0600  Gross per 24 hour   Intake 2400 ml   Output 1450 ml   Net 950 ml          Labs/Accuchecks:  Recent Labs   Lab 07/25/25  0209   WBC 6.73   RBC 3.79*   HGB 10.8*   HCT 32.8*         Recent Labs   Lab 07/25/25  0209      K 4.1   CO2 19*   *   BUN 18   CREATININE 1.1   ALKPHOS 62   ALT 39   AST 36   BILITOT 0.3    No results for input(s): "PROTIME", "INR", "APTT", "HEPANTIXA" in the last 168 hours. No results for input(s): "CPK", "CPKMB", "TROPONINI", "MB" in the last 168 hours.    Electrolytes: Electrolytes replaced  Accuchecks: none    Gtts:   0.9% NaCl   Intravenous Continuous   Stopped at 07/24/25 1602       LDA/Wounds:    Farhad Risk Assessment  Sensory Perception: 3-->slightly limited  Moisture: 4-->rarely moist  Activity: 1-->bedfast  Mobility: 3-->slightly limited  Nutrition: 3-->adequate  Friction and Shear: 3-->no apparent problem  Farhad Score: 17  Is your farhad score 12 " or less? no          Restraints:        [employer]

## 2025-07-25 NOTE — PT/OT/SLP EVAL
"Occupational Therapy   Evaluation    Name: Carrol Molina  MRN: [id number]  Admitting Diagnosis: Aneurysm of basilar artery  Recent Surgery: * No procedures listed * 1 Day Post-Op    Recommendations:     Discharge Recommendations: Low Intensity Therapy  Discharge Equipment Recommendations:  walker, rolling  Barriers to discharge:       Assessment:     Carrol Molina is a 64 y.o. female with a medical diagnosis of Aneurysm of basilar artery. Performance deficits affecting function: weakness, impaired endurance, impaired balance, impaired cardiopulmonary response to activity.  Pt agreeable to therapy and tolerated well. Pt performed toileting, standing ADLs, and home mobility this date. Pt with slight BLE shaking but no LOB or buckling. Pt remains limited in ADLs, functional mobility, and functional transfers. Patient currently demonstrates a need for low intensity therapy on a scheduled basis secondary to a decline in functional status due to surgical procedure      Rehab Prognosis: Good; patient would benefit from acute skilled OT services to address these deficits and reach maximum level of function.     Rehab technician present to assist as needed during session  Plan:     Patient to be seen 3 x/week to address the above listed problems via self-care/home management, therapeutic activities, therapeutic exercises, neuromuscular re-education  Plan of Care Expires: 08/25/25  Plan of Care Reviewed with: patient, spouse, daughter    Subjective     Chief Complaint: none  Patient/Family Comments/goals: "I needed to use the restroom"    Occupational Profile:  Living Environment: Pt lives with spouse in [facility] with shower chair  Previous level of function: (I) ADLs and mobility. 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  Patient found up in chair with telemetry, pulse ox (continuous), blood pressure cuff, PureWick upon OT entry to room.    General Precautions: Standard, fall  Orthopedic Precautions:    Braces: N/A  Respiratory Status: Room air    Occupational Performance:    Bed Mobility:    N/A 2/2 UIC upon OT arrival    Functional Mobility/Transfers:  Patient completed Sit <> Stand Transfer with CGA from chair and SBA from toilet  with  rolling walker   Patient completed Toilet Transfer Step Transfer technique with contact guard assistance with  rolling walker  Functional Mobility: Pt engaged in functional mobility throughout hospital room ~ 10 ft to toilet then 30ft throughout room with RW and  CGA progressing to SBA to maximize functional endurance and dynamic balance required for home/community mobility and occupational engagement.     Activities of Daily Living:  Grooming: stand by assistance pt stood in RW at sink and performed facial and hand hygiene  Toileting: stand by assistance pt sat on standard toilet in bathroom to void and performed pericare in sitting on toilet    Cognitive/Visual Perceptual:  Cognitive/Psychosocial Skills:     -       Oriented to: Person, Place, Time, and Situation   -       Follows Commands/attention:Follows multistep  commands  -       Communication: clear/fluent  -       Memory: No Deficits noted  -       Safety awareness/insight to disability: intact   -       Mood/Affect/Coping skills/emotional control: Appropriate to situation    Physical Exam:  Sensation:    -       Intact  Dominant hand:    -       Right  Upper Extremity Range of Motion:     -       Right Upper Extremity: WNL  -       Left Upper Extremity: WNL  Upper Extremity Strength:    -       Right Upper Extremity: WNL  -       Left Upper Extremity: WNL   Strength:    -       Right Upper Extremity:  WNL  -       Left Upper Extremity: WNL  Fine Motor Coordination:    -       Intact  Left hand, manipulation of objects and Right hand, manipulation of objects    AMPAC 6 Click ADL:  AMPAC Total Score: 22    Treatment & Education:  -OT, and pt participated in extensive educational conversation about safety /c performance of ADLs/FM, energy conservation strategies, levels of assistance needed for safe performance of ADLs/FM, use of DME/AE, home set-up for safety/energy conservation, role of HH OT/PT,  and proper performance of shower and toilet transfers. Pt and spouse receptive to education, asked questions for clarification, and demonstrated understanding by restating education and giving examples of planned implementation for return home.     -Education on energy conservation and task modification to maximize safety and (I) during ADLs and mobility  -Education on importance of OOB activity to improve overall activity tolerance and promote recovery  -Pt educated to call for assistance and to transfer with hospital staff only  -Provided education regarding role of OT, POC, & discharge recommendations with pt and family verbalizing understanding.  Pt had no further questions & when asked whether there were any concerns pt reported none.      Patient left up in chair with all lines intact, call button in reach, nurse notified, and family present    GOALS:   Multidisciplinary Problems       Occupational Therapy Goals          Problem: Occupational Therapy    Goal Priority Disciplines Outcome Interventions   Occupational Therapy Goal     OT, PT/OT Progressing    Description: Goals to be met by: 8/25/2025     Patient will increase functional independence with ADLs by performing:    UE Dressing with Supervision.  LE Dressing with Supervision.  Grooming while standing at sink with Supervision.  Toileting from toilet with Supervision for hygiene and clothing management.   Step transfer with Supervision  Toilet transfer to  toilet with Supervision.                         DME Justifications:   Carrol's mobility limitation cannot be sufficiently resolved by the use of a cane. Her functional mobility deficit can be sufficiently resolved with the use of a Rolling Walker. Patient's mobility limitation significantly impairs their ability to participate in one of more activities of daily living.  The use of a RW will significantly improve the patient's ability to participate in MRADLS and the patient will use it on regular basis in the home.    History:     Past Medical History:   Diagnosis Date    Bipolar disorder     H/O angiography     Feb 2025    Hypertension          Past Surgical History:   Procedure Laterality Date    CHOLECYSTECTOMY  2008    COLON SURGERY  08/17/2018    HYSTERECTOMY  1984    MOUTH SURGERY  07/2017    oopherectomy  2005    TONSILLECTOMY  1984    TOTAL KNEE ARTHROPLASTY       bilateral       Time Tracking:     OT Date of Treatment: 07/25/25  OT Start Time: 1054  OT Stop Time: 1121  OT Total Time (min): 27 min    Billable Minutes:Evaluation 10 min  Self Care/Home Management 17 min    7/25/2025

## 2025-07-25 NOTE — EICU
Virtual ICU Quality Rounds    Admit Date: 7/24/2025  Hospital Day: 1    ICU Day: 15h    24H Vital Sign Range:  Temp:  [97.6 °F (36.4 °C)-98.4 °F (36.9 °C)]   Pulse:  [55-79]   Resp:  [12-33]   BP: (106-145)/(53-82)   SpO2:  [96 %-100 %]     VICU Surveillance Screening  LDA reconciliation: Yes

## 2025-07-25 NOTE — PROGRESS NOTES
[facility]  Neurocritical Care  Progress Note    Admit Date: 7/24/2025  Service Date: 07/25/2025  Length of Stay: 1    Subjective:     Chief Complaint: Aneurysm of basilar artery    History of Present Illness: Ms. 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     Patient was followed by her neurologist in [place]. He noted in October of 2024 that Ms. Ochoa's basilar tip aneurysm had increased slightly in size from imaging in 2017. She was referred to Dr. Ibarra for possible intervention at that time.     Patient is now s/p stent-assisted coiling of the basilar tip aneurysm. She will be monitored in ICU overnight.     Hospital Course: 07/25/2025: Neurologically stable. Feels unstable on feet. Evaluated by OT who recommended rolling walker. Will have this delivered to her home and referral sent for  OT and PT. OK to discharge home on ASA and brilinta.     Interval History: See hospital course     Review of Systems: Reports right wrist soreness, lower extremity weakness. Denies headache, numbness, tingling.     Vitals:   Temp: 98.2 °F (36.8 °C)  Pulse: 61  Rhythm: normal sinus rhythm  BP: (!) 109/58  MAP (mmHg): 85  Resp: (!) 28  SpO2: 97 %    Temp  Min: 97.6 °F (36.4 °C)  Max: 98.4 °F (36.9 °C)  Pulse  Min: 55  Max: 79  BP  Min: 106/55  Max: 149/66  MAP (mmHg)  Min: 77  Max: 105  Resp  Min: 12  Max: 34  SpO2  Min: 96 %  Max: 100 %    07/24 0701 - 07/25 0700  In: 2400 [P.O.:1500; I.V.:400]  Out: 1450 [Urine:1450]   Unmeasured Output  Unmeasured Urine Occurrence: 1  Unmeasured Stool Occurrence: 0     Examination:   Constitutional: Well-nourished and -developed. No apparent distress.   Eyes: Conjunctiva clear, anicteric. Lids no lesions.  Head/Ears/Nose/Mouth/Throat/Neck: Moist mucous membranes. External ears, nose  atraumatic.   Cardiovascular: Regular rhythm. R hand warm, well perfused. Bruising present along left forearm   Respiratory: Comfortable respirations.      Neurologic:  -GCS [id number]  -Alert. Oriented to person, place, and time. Speech fluent. Follows commands.  -Cranial nerves II-XII intact, particularly PERRL, EOMI, facial expression and sensation symmetric, tongue midline, shoulder shrug symmetric   -Motor 5/5 in all four extremities   -Sensation intact to light touch    Medications:   Continuous Scheduledaspirin, 81 mg, Daily  losartan, 50 mg, Daily  mupirocin, , BID  ticagrelor, 90 mg, BID    PRNacetaminophen, 650 mg, Q6H PRN  hydrALAZINE, 10 mg, Q4H PRN  labetalol, 10 mg, Q4H PRN  magnesium oxide, 800 mg, PRN  magnesium oxide, 800 mg, PRN  potassium bicarbonate, 35 mEq, PRN  potassium bicarbonate, 50 mEq, PRN  potassium bicarbonate, 60 mEq, PRN  potassium, sodium phosphates, 2 packet, PRN  potassium, sodium phosphates, 2 packet, PRN  potassium, sodium phosphates, 2 packet, PRN  sodium chloride 0.9%, 10 mL, PRN       Today I independently reviewed pertinent medications, imaging, laboratory results, notably:       Chem:   Recent Labs   Lab 07/25/25  0209      K 4.1   *   CO2 19*   *   BUN 18   CREATININE 1.1   CALCIUM 8.6*   MG 1.7   PHOS 2.1*   ANIONGAP 7*   PROT 5.9*   ALBUMIN 3.2*   BILITOT 0.3   ALKPHOS 62   AST 36   ALT 39     Heme:   Recent Labs   Lab 07/25/25  0209   WBC 6.73   HGB 10.8*   HCT 32.8*            Assessment/Plan:     Neuro  * Aneurysm of basilar artery  63 y/o with basilar tip aneurysm initially incidentally found in 2017 during orthostatic tremor w/u. Aneurysm noted to have grown on recent MRA in 2024, and patient referred for intervention. She is now s/p stent-assisted coiling of the basilar tip aneurysm.     - Admitted to [facility]   - Hourly neuro checks while in ICU   - Continue DAPT with ASA and ticagrelor.   - patient reports subjective intolerance of ticagrelor,  that she does not like the way it makes her feel. She reports shortness of breath and weakness in her legs. Patient reports she was previously on Plavix and asa, but the Plavix was ineffective. platelet response assay on 7/18 showed 212 PRU  - IR aware of this complaint.   - Encouraged patient to address this at her outpatient follow up appointment.   - Hourly neurovascular checks  - SBP goal <140, PRNs available     Cardiac/Vascular  Hypertension  SBP goal <140   - Home antihypertensives reordered  - PRNs available           The patient is being Prophylaxed for:  Venous Thromboembolism with: Mechanical  Stress Ulcer with: Not Applicable   Ventilator Pneumonia with: not applicable    Activity Orders            Progressive Mobility Protocol (mobilize patient to their highest level of functioning at least twice daily) starting at 07/24 2000    Progressive Mobility Protocol (mobilize patient to their highest level of functioning at least twice daily) starting at 07/24 2000    Diet Adult Regular: Regular starting at 07/24 1750          Full Code    I spent 35 minutes reviewing patient records, examining, and counseling the patient with greater than 50% of the time spent with direct patient care and coordination.       Sara Mazariegos PA-C  Neurocritical Care  [facility]

## 2025-07-25 NOTE — PLAN OF CARE
Problem: Occupational Therapy  Goal: Occupational Therapy Goal  Description: Goals to be met by: 8/25/2025     Patient will increase functional independence with ADLs by performing:    UE Dressing with Supervision.  LE Dressing with Supervision.  Grooming while standing at sink with Supervision.  Toileting from toilet with Supervision for hygiene and clothing management.   Step transfer with Supervision  Toilet transfer to toilet with Supervision.    Outcome: Progressing

## 2025-07-25 NOTE — DISCHARGE SUMMARY
[facility]  Neurosurgery  Discharge Summary      Patient Name: Carrol Molina  MRN: [id number]  Admission Date: 7/24/2025  Hospital Length of Stay: 1 days  Discharge Date and Time: 07/25/2025 9:01 AM  Attending Physician: Kalpana Ibarra MD   Discharging Provider: Stephon Agudelo MD  Primary Care Provider: Dillan Resendez MD    HPI:   Carrol Molina is a 64 y.o. 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 She has been followed in clinic by Dr. Ibarra for a basilar tip aneurysm measuring 3.5 mm in height, 4.08 mm in width and 4.12 mm neck diameter.  After undergoing DSA in February, she was recommended to undergo treatment and opted to proceed. She tolerated the procedure without complication by radial access.  She is admitted to neuro ICU for close postprocedure monitoring.  She is doing well postoperatively.  She denies headaches, vision changes, nausea, vomiting, focal weakness, or sensory dysfunction.     * No procedures listed *     Hospital Course: Patient was admitted for  and underwent stent assisted coil of basilar tip aneurysm without periprocedural complications and was monitored closely in the ICU setting without issue. At the time of discharge, the patient was tolerating PO intake without N/V, dysphagia, denied bowel or bladder dysfunction, denied new neurological symptoms, and reported pain controlled with current regimen. The patient woke from anesthesia at baseline neuro-status. The groin was soft without a hematoma, distal pulses were palpable, and vital signs were stable. The patient will follow up in clinic as indicated in discharge instructions. All questions were answered and continued treatment/woundcare instructions were discussed in detail prior to discharge.     Physical exam:  GENERAL: resting comfortably  HEENT: NCAT, PERRL, mucous membranes moist  NECK: supple, trachea midline  CV: normal capillary  refill  PULM: aerating well, symmetric expansion, no distress  ABD: soft, NT, ND  EXT: no c/c/e    NEURO:    AAO x 3  CN II-XII grossly intact  Fc x 4 antigravity  SILT    No drift or dysmetria      Goals of Care Treatment Preferences:  Code Status: Full Code      Consults: NCC / IR    Significant Diagnostic Studies: Labs: All labs within the past 24 hours have been reviewed    Pending Diagnostic Studies:       Procedure Component Value Units Date/Time    IR Angiogram Carotid Cerebral Bilateral [[id number]] Resulted: 07/24/25 1500    Order Status: Sent Lab Status: In process Updated: 07/24/25 1541          Final Active Diagnoses:    Diagnosis Date Noted POA    PRINCIPAL PROBLEM:  Aneurysm of basilar artery [I72.5] 09/04/2018 Yes    Hypertension [I10] 09/01/2017 Yes      Problems Resolved During this Admission:      Discharged Condition: stable     Disposition: Home or Self Care    Follow Up:    Patient Instructions:   --Patient stable for discharge to home    --Please take prescriptions as detailed in medication list  -If you are prescribed Plavix or Brilinta DO NOT stop taking unless your Neurosurgeon specifically tells you, also DO NOT take nexium or protonix while on these medications  -If there are any issues getting your Plavix or Brilinta for any reason, call the Neurosurgery Clinic immediately    --All questions/concerns addressed and answered    --Please followup with neurosurgery clinic in 2 weeks; to be arranged by Neurosurgery Clinic     --Please call immediately for any new onset nausea/vomiting/fever/chills, numbness/tingling/weakness, wrist swelling, or lower extremity color changes    Wrist Care Instructions:  -If you have any dressings at discharge, please remove 24 hours after the surgery.  -You may shower daily but do not soak or submerge wound in water for 7 days  -Keep wrist site clean, dry, and open to air.  -Do not apply creams or ointments to the wound.  -Call Neurosurgery if the wound opens,  drains, or becomes red, or swollen.  -No lifting greater than 10 pounds      [facility] LAB SERVICES REQUEST     Order Specific Question Answer Comments   Requesting Specialty: Neuro IR    When is the model needed? 7/24/2025    Has imaging been completed? Yes    Accession Number: [id number]    Brief description of pathology: Basilar apex aneurysm    Attestation: I confirm that the ordering physician has or will review the original standard 2D imaging in conjunction with the advanced visualization model.      Medications:  Reconciled Home Medications:      Medication List        START taking these medications      acetaminophen 325 MG tablet  Commonly known as: TYLENOL  Take 2 tablets (650 mg total) by mouth every 6 (six) hours as needed.            CONTINUE taking these medications      allopurinoL 100 MG tablet  Commonly known as: ZYLOPRIM  Take 100 mg by mouth once daily.     aspirin 81 MG EC tablet  Commonly known as: ECOTRIN  Take 1 tablet (81 mg total) by mouth once daily.     irbesartan 150 MG tablet  Commonly known as: AVAPRO  Take 150 mg by mouth once daily.     ticagrelor 90 mg tablet  Commonly known as: BRILINTA  Take 1 tablet (90 mg total) by mouth 2 (two) times daily.              Stephon Agudelo MD  Neurosurgery  [facility]

## 2025-07-25 NOTE — NURSING
AVS reviewed with pt and . All questions answered. Pt brought down to personal vehicle by RN. All PIVS removed

## 2025-07-25 NOTE — DISCHARGE INSTRUCTIONS
--Patient stable for discharge to home    --Please take prescriptions as detailed in medication list  -If you are prescribed Plavix or Brilinta DO NOT stop taking unless your Neurosurgeon specifically tells you, also DO NOT take nexium or protonix while on these medications  -If there are any issues getting your Plavix or Brilinta for any reason, call the Neurosurgery Clinic immediately    --All questions/concerns addressed and answered    --Please followup with neurosurgery clinic in 2 weeks; to be arranged by Neurosurgery Clinic     --Please call immediately for any new onset nausea/vomiting/fever/chills, numbness/tingling/weakness, wrist swelling, or lower extremity color changes    Wrist Care Instructions:  -If you have any dressings at discharge, please remove 24 hours after the surgery.  -You may shower daily but do not soak or submerge wound in water for 7 days  -Keep wrist site clean, dry, and open to air.  -Do not apply creams or ointments to the wound.  -Call Neurosurgery if the wound opens, drains, or becomes red, or swollen.  -No lifting greater than 10 pounds

## 2025-07-25 NOTE — PLAN OF CARE
[facility]  Discharge Final Note    Primary Care Provider: Dillan Resendez MD    Expected Discharge Date: 7/25/2025    Final Discharge Note (most recent)       Final Note - 07/25/25 0942          Final Note    Assessment Type Final Discharge Note (P)      Anticipated Discharge Disposition Home or Self Care (P)      What phone number can be called within the next 1-3 days to see how you are doing after discharge? [phone] (P)         Post-Acute Status    Patient choice form signed by patient/caregiver List with quality metrics by geographic area provided (P)      Discharge Delays None known at this time (P)                      Important Message from Medicare         Patient discharged with family, Rolling walker will be delivered to the home through [employer] and referrals were sent for home health.  There are  no  other discharge needs at the moment per medical team.        Discharge Plan A and Plan B have been determined by review of patient's clinical status, future medical and therapeutic needs, and coverage/benefits for post-acute care in coordination with multidisciplinary team members.    Kathleen Chery MSW, LCSW  [facility]  Case Management Dept.

## 2025-07-25 NOTE — ASSESSMENT & PLAN NOTE
65 y/o with basilar tip aneurysm initially incidentally found in 2017 during orthostatic tremor w/u. Aneurysm noted to have grown on recent MRA in 2024, and patient referred for intervention. She is now s/p stent-assisted coiling of the basilar tip aneurysm.     - Admitted to [facility]   - Hourly neuro checks while in ICU   - Continue DAPT with ASA and ticagrelor.   - patient reports subjective intolerance of ticagrelor, that she does not like the way it makes her feel. She reports shortness of breath and weakness in her legs. Patient reports she was previously on Plavix and asa, but the Plavix was ineffective. platelet response assay on 7/18 showed 212 PRU  - IR aware of this complaint.   - Encouraged patient to address this at her outpatient follow up appointment.   - Hourly neurovascular checks  - SBP goal <140, PRNs available

## 2025-07-25 NOTE — PLAN OF CARE
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 Dowling      Hypertension [I10]  Yes      Resolved Hospital Problems   No resolved problems to display.       Follow Up Appointments:  No future appointments.    Allergies:  Review of patient's allergies indicates:   Allergen Reactions    Codeine Hives    Diphenhydramine Other (See Comments)     chest pains  Other reaction(s): Other (See Comments), Unknown  chest pains  chest pains    Tolmetin Other (See Comments)     stomach pains.  Other reaction(s): Unknown  stomach pains.    Latex Itching and Swelling     Can the patient chew gum without allergic reation? yes  Can the patient blow up a balloon without reaction? No  Is the patient allergic to kiwi, bananas, avocado, or chestnuts? No  Immediate itch or reaction to latex gloves yes  Known allergy to latex (i.e. blood test)? Yes  Does the patient require special undergarments, such as panties/briefs with special waist bandno  Can the patient chew gum without allergic reation? yes  Can the patient blow up a balloon without reaction? No  Is the patient allergic to kiwi, bananas, avocado, or chestnuts? No  Immediate itch or reaction to latex gloves yes  Known allergy to latex (i.e. blood test)? Yes  Does the patient require special undergarments, such as panties/briefs with special waist bandno  Can the patient chew gum without allergic reation? yes  Can the patient blow up a balloon without reaction? No  Is the patient allergic to kiwi, bananas, avocado, or chestnuts? No  Immediate itch or  reaction to latex gloves yes  Known allergy to latex (i.e. blood test)? Yes  Does the patient require special undergarments, such as panties/briefs with special waist bandno    Metronidazole Other (See Comments)     Sores on inside of tongue       Medications: Review discharge medications with patient and family and provide education.    Current Medications[1]     Medication List        START taking these medications      acetaminophen 325 MG tablet  Commonly known as: TYLENOL  Take 2 tablets (650 mg total) by mouth every 6 (six) hours as needed.            CONTINUE taking these medications      allopurinoL 100 MG tablet  Commonly known as: ZYLOPRIM  Take 100 mg by mouth once daily.     aspirin 81 MG EC tablet  Commonly known as: ECOTRIN  Take 1 tablet (81 mg total) by mouth once daily.     irbesartan 150 MG tablet  Commonly known as: AVAPRO  Take 150 mg by mouth once daily.     ticagrelor 90 mg tablet  Commonly known as: BRILINTA  Take 1 tablet (90 mg total) by mouth 2 (two) times daily.                I have seen and examined this patient within the last 30 days. My clinical findings that support the need for the home health skilled services and home bound status are the following:no   Weakness/numbness causing balance and gait disturbance due to Surgery making it taxing to leave home.     Diet:   regular diet      Referrals/ Consults  Physical Therapy to evaluate and treat. Evaluate for home safety and equipment needs; Establish/upgrade home exercise program. Perform / instruct on therapeutic exercises, gait training, transfer training, and Range of Motion.  Occupational Therapy to evaluate and treat. Evaluate home environment for safety and equipment needs. Perform/Instruct on transfers, ADL training, ROM, and therapeutic exercises.    Activities:   activity as tolerated        Home Health Aide:  Physical Therapy Twice weekly and Occupational Therapy Twice weekly    Wound Care Orders  no    I certify that this  patient is confined to her home and needs physical therapy and occupational therapy.              [1]   Current Facility-Administered Medications   Medication Dose Route Frequency Provider Last Rate Last Admin    acetaminophen tablet 650 mg  650 mg Oral Q6H PRN Guillermo Ervin PA-C   650 mg at 07/25/25 0850    aspirin EC tablet 81 mg  81 mg Oral Daily Sara Mazariegos PA-C   81 mg at 07/25/25 0850    hydrALAZINE injection 10 mg  10 mg Intravenous Q4H PRN Sara Mazariegos PA-C        labetalol 20 mg/4 mL (5 mg/mL) IV syring  10 mg Intravenous Q4H PRN Sara Mazariegos PA-C        losartan tablet 50 mg  50 mg Oral Daily Sara Mazariegos PA-C   50 mg at 07/25/25 0851    magnesium oxide tablet 800 mg  800 mg Oral PRN Guillermo Ervin PA-C   800 mg at 07/25/25 0453    magnesium oxide tablet 800 mg  800 mg Oral PRN Guillermo Ervin PA-C        mupirocin 2 % ointment   Nasal BID Kalpana Ibarra MD   Given at 07/25/25 0851    potassium bicarbonate disintegrating tablet 35 mEq  35 mEq Oral PRN Guillermo Ervin PA-C        potassium bicarbonate disintegrating tablet 50 mEq  50 mEq Oral PRN Guillermo Ervin PA-C        potassium bicarbonate disintegrating tablet 60 mEq  60 mEq Oral PRN Guillermo Ervin PA-C        potassium, sodium phosphates 280-160-250 mg packet 2 packet  2 packet Oral PRN Guillermo Ervin PA-C        potassium, sodium phosphates 280-160-250 mg packet 2 packet  2 packet Oral PRN MickalGuillermo PA-C   2 packet at 07/25/25 0850    potassium, sodium phosphates 280-160-250 mg packet 2 packet  2 packet Oral PRN Guillermo Ervin PA-C        sodium chloride 0.9% flush 10 mL  10 mL Intravenous PRN Nando Garcia MD        ticagrelor tablet 90 mg  90 mg Oral BID Sara Mazariegos PA-C   90 mg at 07/25/25 0850

## 2025-07-25 NOTE — PLAN OF CARE
[facility]  Initial Discharge Assessment       Primary Care Provider: Dillan Resendez MD    Admission Diagnosis: Cerebral aneurysm, nonruptured [I67.1]    Admission Date: 7/24/2025  Expected Discharge Date: 7/25/2025    Transition of Care Barriers: None    Payor: CIGNA MANAGED MEDICARE / Plan: CIGNA MEDICARE ADVANTAGE / Product Type: Medicare Advantage /     Extended Emergency Contact Information  Primary Emergency Contact: Tanmay Molina  Address: [address] [place] [address], [place] [address] [facility]  Home Phone: [phone]  Mobile Phone: [phone]  Relation: Spouse    Discharge Plan A: Home with family  Discharge Plan B: Home      [facility] - [place], [place] - [address]  [address]  [place] [address]  Phone: [phone] Fax: [phone]    [id number] - [place], [place] - [address] AT [facility] [address]  [address] [facility] [place] [address]  Phone: [phone] Fax: [phone]    [id number] - [place] - [address] AT [facility] & [address]  [address]  [facility] [address]  Phone: [phone] Fax: [phone]    [id number] - [place] - [address] AT [address]  [address]  [place] [address]  Phone: [phone] Fax: [phone]    [id number] - [place] - [address] AT [facility] & [facility]  [address]  [place] [address]  Phone: [phone] Fax: [phone]    [id number] - [place] - [address] [facility] [address] AT [facility] & [address]  [place] [address]  Phone: [phone] Fax: [phone]    [facility] - [place], [place] - [address] [place] [address]  Phone: [phone] Fax: [phone]      Initial Assessment (most recent)       Adult Discharge  Assessment - 07/25/25 0932          Discharge Assessment    Assessment Type Discharge Planning Assessment     Confirmed/corrected address, phone number and insurance Yes     Confirmed Demographics Correct on Facesheet     Source of Information patient     Communicated JOHANNE with patient/caregiver Yes     People in Home spouse     Name(s) of People in Home Tanmay Molina  [phone]     Do you expect to return to your current living situation? Yes     Do you have help at home or someone to help you manage your care at home? No     Prior to hospitilization cognitive status: Unable to Assess     Current cognitive status: Alert/Oriented     Walking or Climbing Stairs Difficulty no     Dressing/Bathing Difficulty no     Home Layout Able to live on 1st floor     Equipment Currently Used at Home none     Readmission within 30 days? No     Patient currently being followed by outpatient case management? No     Do you currently have service(s) that help you manage your care at home? No     Do you take prescription medications? Yes     Do you have prescription coverage? Yes     Do you have any problems affording any of your prescribed medications? No     Is the patient taking medications as prescribed? yes     Who is going to help you get home at discharge? Tanmay Molina      How do you get to doctors appointments? car, drives self     Are you on dialysis? No     Do you take coumadin? No     Discharge Plan A Home with family     Discharge Plan B Home     DME Needed Upon Discharge  walker, standard     Discharge Plan discussed with: Patient     Transition of Care Barriers None        Physical Activity    On average, how many days per week do you engage in moderate to strenuous exercise (like a brisk walk)? 0 days     On average, how many minutes do you engage in exercise at this level? 0 min        Financial Resource Strain    How hard is it for you to pay for the very basics like food, housing, medical care, and  heating? Not hard at all        Housing Stability    In the last 12 months, was there a time when you were not able to pay the mortgage or rent on time? No     At any time in the past 12 months, were you homeless or living in a shelter (including now)? No        Transportation Needs    In the past 12 months, has lack of transportation kept you from medical appointments or from getting medications? No     In the past 12 months, has lack of transportation kept you from meetings, work, or from getting things needed for daily living? No        Food Insecurity    Within the past 12 months, you worried that your food would run out before you got the money to buy more. Never true     Within the past 12 months, the food you bought just didn't last and you didn't have money to get more. Never true        Stress    Do you feel stress - tense, restless, nervous, or anxious, or unable to sleep at night because your mind is troubled all the time - these days? Only a little        Social Isolation    How often do you feel lonely or isolated from those around you?  Never        Alcohol Use    Q1: How often do you have a drink containing alcohol? Never     Q2: How many drinks containing alcohol do you have on a typical day when you are drinking? Patient does not drink     Q3: How often do you have six or more drinks on one occasion? Never        Utilities    In the past 12 months has the electric, gas, oil, or water company threatened to shut off services in your home? Yes        Health Literacy    How often do you need to have someone help you when you read instructions, pamphlets, or other written material from your doctor or pharmacy? Never                   The SW met with the patient  and spouse Tanmay Molina   at bedside. The SW placed name and contact information on the blackboard in the patient's room. Use preferred pharmacy / bedside delivery for any necessary medications at the time of discharge. The patient  is independent with all ADLs. The patient is not on Dialysis or Coumadin. The Patient does not use DME or home oxygen, The patient's spouse  will provide assistance to the patient upon discharge. The patient's spouse will provide transportation upon discharge. SW will continue to follow for course of hospitalization.  A discharge planning booklet was left at bedside.        Discharge Plan A and Plan B have been determined by review of patient's clinical status, future medical and therapeutic needs, and coverage/benefits for post-acute care in coordination with multidisciplinary team members.    Kathleen Chery MSW, JADAW  [facility]  Case Management Dept.

## 2025-07-26 ENCOUNTER — NURSE TRIAGE (OUTPATIENT)
Dept: ADMINISTRATIVE | Facility: CLINIC | Age: 65
End: 2025-07-26
Payer: MEDICARE

## 2025-07-26 LAB
POC ACTIVATED CLOTTING TIME K: 239 SEC (ref 74–137)
SAMPLE: ABNORMAL

## 2025-07-26 NOTE — TELEPHONE ENCOUNTER
PD Day 1 pt. Pt denies new or worsening of s/s for triage. Pt states that she is weak, SOB and shaky from Brilinta 90 mg as ordered. Pt states that provider advised that she would have these s/s. Pt states that she is fine and getting ready to go home. Pt declined triage at this time. Encounter routed to provider.   Reason for Disposition   Caller has cancelled the call before the first contact    Protocols used: No Contact or Duplicate Contact Call-A-AH

## 2025-07-27 ENCOUNTER — TELEPHONE (OUTPATIENT)
Dept: ADMINISTRATIVE | Facility: CLINIC | Age: 65
End: 2025-07-27
Payer: MEDICARE

## 2025-07-27 NOTE — TELEPHONE ENCOUNTER
Day 2 Post-Discharge SMS Tracker     Pt states that she was supposed to have a rolling walker delivered to her on yesterday as advised by her .  It has not arrived.  The order was sent to [facility].  Pt also states that she did not receive a call from home health yet.      I spoke to them - they only deliver walkers on M-F 8am-5pm.  She was not able to verify if they order was received.  I notified the patient that I would send a message to the  for assistance and for her to follow up in the am.     Pt verbalized understanding.

## 2025-08-06 ENCOUNTER — CLINICAL SUPPORT (OUTPATIENT)
Dept: NEUROSURGERY | Facility: CLINIC | Age: 65
End: 2025-08-06
Payer: MEDICARE

## 2025-08-06 DIAGNOSIS — I67.1 CEREBRAL ANEURYSM, NONRUPTURED: Primary | ICD-10-CM

## 2025-08-21 ENCOUNTER — TELEPHONE (OUTPATIENT)
Dept: NEUROSURGERY | Facility: CLINIC | Age: 65
End: 2025-08-21
Payer: MEDICARE

## 2025-08-21 ENCOUNTER — PATIENT MESSAGE (OUTPATIENT)
Dept: NEUROSURGERY | Facility: CLINIC | Age: 65
End: 2025-08-21
Payer: MEDICARE

## 2025-08-21 DIAGNOSIS — I67.1 CEREBRAL ANEURYSM, NONRUPTURED: Primary | ICD-10-CM

## 2025-08-22 ENCOUNTER — TELEPHONE (OUTPATIENT)
Dept: NEUROSURGERY | Facility: CLINIC | Age: 65
End: 2025-08-22
Payer: MEDICARE

## 2025-08-25 ENCOUNTER — TELEPHONE (OUTPATIENT)
Dept: NEUROSURGERY | Facility: CLINIC | Age: 65
End: 2025-08-25
Payer: MEDICARE

## 2025-08-25 DIAGNOSIS — I67.1 CEREBRAL ANEURYSM, NONRUPTURED: Primary | ICD-10-CM

## 2025-08-28 ENCOUNTER — PATIENT MESSAGE (OUTPATIENT)
Dept: NEUROSURGERY | Facility: CLINIC | Age: 65
End: 2025-08-28
Payer: MEDICARE

## 2025-09-03 ENCOUNTER — TELEPHONE (OUTPATIENT)
Dept: NEUROSURGERY | Facility: CLINIC | Age: 65
End: 2025-09-03
Payer: MEDICARE